# Patient Record
Sex: FEMALE | Race: AMERICAN INDIAN OR ALASKA NATIVE
[De-identification: names, ages, dates, MRNs, and addresses within clinical notes are randomized per-mention and may not be internally consistent; named-entity substitution may affect disease eponyms.]

---

## 2017-07-20 ENCOUNTER — HOSPITAL ENCOUNTER (INPATIENT)
Dept: HOSPITAL 5 - TRG | Age: 37
LOS: 5 days | Discharge: HOME | DRG: 781 | End: 2017-07-25
Attending: OBSTETRICS & GYNECOLOGY | Admitting: OBSTETRICS & GYNECOLOGY
Payer: COMMERCIAL

## 2017-07-20 DIAGNOSIS — F41.9: ICD-10-CM

## 2017-07-20 DIAGNOSIS — F43.10: ICD-10-CM

## 2017-07-20 DIAGNOSIS — Z92.21: ICD-10-CM

## 2017-07-20 DIAGNOSIS — Z3A.24: ICD-10-CM

## 2017-07-20 DIAGNOSIS — O44.12: Primary | ICD-10-CM

## 2017-07-20 DIAGNOSIS — O36.5920: ICD-10-CM

## 2017-07-20 DIAGNOSIS — O09.522: ICD-10-CM

## 2017-07-20 DIAGNOSIS — Z92.3: ICD-10-CM

## 2017-07-20 DIAGNOSIS — Z85.72: ICD-10-CM

## 2017-07-20 DIAGNOSIS — O99.342: ICD-10-CM

## 2017-07-20 LAB
BASOPHILS NFR BLD AUTO: 0.4 % (ref 0–1.8)
EOSINOPHIL NFR BLD AUTO: 1.5 % (ref 0–4.3)
HCT VFR BLD CALC: 33.9 % (ref 30.3–42.9)
HGB BLD-MCNC: 11.7 GM/DL (ref 10.1–14.3)
HIV-1 ANTIGEN P24: (no result)
HIVR-1/2 AB: (no result)
MCH RBC QN AUTO: 32 PG (ref 28–32)
MCHC RBC AUTO-ENTMCNC: 35 % (ref 30–34)
MCV RBC AUTO: 93 FL (ref 79–97)
PLATELET # BLD: 166 K/MM3 (ref 140–440)
RBC # BLD AUTO: 3.64 M/MM3 (ref 3.65–5.03)
WBC # BLD AUTO: 8.1 K/MM3 (ref 4.5–11)

## 2017-07-20 PROCEDURE — 86900 BLOOD TYPING SEROLOGIC ABO: CPT

## 2017-07-20 PROCEDURE — 86592 SYPHILIS TEST NON-TREP QUAL: CPT

## 2017-07-20 PROCEDURE — 86803 HEPATITIS C AB TEST: CPT

## 2017-07-20 PROCEDURE — 81001 URINALYSIS AUTO W/SCOPE: CPT

## 2017-07-20 PROCEDURE — 83735 ASSAY OF MAGNESIUM: CPT

## 2017-07-20 PROCEDURE — 85025 COMPLETE CBC W/AUTO DIFF WBC: CPT

## 2017-07-20 PROCEDURE — 87806 HIV AG W/HIV1&2 ANTB W/OPTIC: CPT

## 2017-07-20 PROCEDURE — 76816 OB US FOLLOW-UP PER FETUS: CPT

## 2017-07-20 PROCEDURE — 36415 COLL VENOUS BLD VENIPUNCTURE: CPT

## 2017-07-20 PROCEDURE — 76805 OB US >/= 14 WKS SNGL FETUS: CPT

## 2017-07-20 PROCEDURE — 76819 FETAL BIOPHYS PROFIL W/O NST: CPT

## 2017-07-20 PROCEDURE — 86850 RBC ANTIBODY SCREEN: CPT

## 2017-07-20 PROCEDURE — 85660 RBC SICKLE CELL TEST: CPT

## 2017-07-20 PROCEDURE — 86901 BLOOD TYPING SEROLOGIC RH(D): CPT

## 2017-07-20 PROCEDURE — 86706 HEP B SURFACE ANTIBODY: CPT

## 2017-07-20 PROCEDURE — 76820 UMBILICAL ARTERY ECHO: CPT

## 2017-07-20 RX ADMIN — BETAMETHASONE SODIUM PHOSPHATE AND BETAMETHASONE ACETATE SCH MG: 3; 3 INJECTION, SUSPENSION INTRA-ARTICULAR; INTRALESIONAL; INTRAMUSCULAR at 21:05

## 2017-07-20 NOTE — HISTORY AND PHYSICAL REPORT
<ITALO BUI - Last Filed: 17 21:07>





History of Present Illness


Date of examination: 17 (Received a call from pt from her home that she 

was having heavy bleeding; pt has dx previa)


Chief complaint: 


bleeding started today @ 1700  Pt called CNM @ 1845 Pt was instructed to come 

directly to Triage"I have my daughter with me. I have to wait on my ."





History of present illness: 


EDC Confirmation:  2017


Gestational Age:  11 1/7 weeks





Past Pregnancy History 


   :      2


   Term Births:      1


   Living Children:   1





Pregnancy # 1


   Delivery date:     3/2006


   Weeks Gestation:   term


   Delivery type:     


   Anesthesia type:     epidural


   Delivery location:     [facility]


   Infant Sex:      Female


   Birth weight:      5-14








Past Medical History:


   Reviewed history from 2017 and no changes required:


      nonhodgekins lymphoma 3/2010 had chemo and radiation


      last radiation/chemo was 


      PTSD


      Anxiety/OCD





Past Surgical History:


   Reviewed history from 2017 and no changes required:


      


      lymphectomy


      port placement in and out


      Breast Surgery: 


      bone marrow aspiration


      Denies any prior history of complications from anesthesia. 


      Breast Biopsy: 2016 benign





Past Medical History 


Abnormal PAP: negative


HUEY Exposure: negative


Infertility: negative


Uterine Anomaly: negative


Uterine Surgery (not C/S): negative


Other Gynecologic Problems: negative





Social Hx: Patient is 





Smoking History:


Patient has never smoked.








Infection History 


Hx of STD: none


HIV Risk Eval: low risk


Hepatitis B Risk Eval: low risk


Personal hx. of genital herpes: no


Partner hx. of genital herpes: no


Rash, Viral, or Febrile illness since last LMP? no


Varicella/Chicken Pox Status: Previous Disease


TB Risk: no





Genetic History 


ADVANCED MATERNAL AGE


Congenital Heart Defect:


    Mom: no  Dad: no


Canavan Disease:


    Mom: no  Dad: no


Thalassemia


    Mom: no  Dad: no


Neural Tube Defect


    Mom: no  Dad: no


Down's Syndrome


    Mom: no  Dad: no


Cheko-Sachs


    Mom: no  Dad: no


Sickle Cell Disease/Trait


    Mom: no  Dad: no


Hemophilia


    Mom: no  Dad: no


Muscular Dystrophy


    Mom: no  Dad: no


Cystic Fibrosis


    Mom: no  Dad: no


Wallace Chorea


    Mom: no  Dad: no


Mental Retardation


    Mom: no  Dad: no


Fragile X


    Mom: no  Dad: no


Other Genetic/Chromosomal Disorder


    Mom: no  Dad: no


Child w/other birth defect


    Mom: no  Dad: no





Enviromental Exposures 


Xray Exposure: no


Medication, drug, or alcohol use since LMP: no


Chemical/Other Exposure: no


Exposure to Cat Liter: no


Hx of Parvovirus (Fifth Disease): no


Occupational Exposure to Children: none


Active Medications (reviewed today):


ZOFRAN ODT 8 MG TBDP (ONDANSETRON) 1 po q12hrs prn


TRNESSA () 





Current Allergies (reviewed today):


No known allergies


Laboratory Results 





Routine Urinalysis 


Leukocytes: negative


Nitrite: negative


Urobilinogen: negative


Protein: negative


Blood: negative


Ketone: negative


Bilirubin: negative


Glucose: negative


Urine HCG: positive





Review of Systems 





General


     Denies fever, chills, sweats, anorexia, fatigue, weakness, malaise, weight 

loss and sleep disorder.





Prenatal


     Denies nausea, vomiting, headache, swelling of legs, abdominal pain, 

vaginal discharge, vaginal bleeding and contractions.








     Denies vaginal discharge, incontinence, dysuria, hematuria, urinary 

frequency, amenorrhea, menorrhagia, abnormal vaginal bleeding, pelvic pain, 

genital sores, decreased libido, painful periods, painful sex, urinary urgency, 

hot flashes, vaginal dryness, vaginal itching and vaginal odor.





CV


     Denies chest pains, palpitations, syncope, dyspnea on exertion, orthopnea, 

PND and peripheral edema.





Resp


     Denies cough, dyspnea at rest, excessive sputum, hemoptysis, wheezing and 

pleurisy.





GI


     Denies nausea, vomiting, diarrhea, constipation, change in bowel habits, 

abdominal pain, melena, hematochezia, jaundice, gas/bloating, indigestion/

heartburn, dysphagia and odynophagia.





Endo


     Denies cold intolerance, heat intolerance, polydipsia, polyphagia, 

polyuria and unusual weight change.





Breast


     Denies left breast lump, right breast lump, nipple discharge, bloody 

discharge from nipple, breast pain, abnormal mammogram and breast enlargement.





MS


     Denies back pain, joint pain, joint swelling, muscle cramps, muscle 

weakness, stiffness, arthritis, sciatica, restless legs, leg pain at night and 

leg pain with exertion.





Derm


     Denies rash, itching, dryness and suspicious lesions.





Neuro


     Denies paralysis, paresthesias, headache, seizures, tremors, vertigo, 

transient blindness, frequent falls, frequent headaches and difficulty walking.





Psych


     Denies depression, anxiety, irritability and mood swings.





Eyes


     Denies blurring, diplopia, irritation, discharge, vision loss, eye pain 

and photophobia.





ENT


     Denies earache, ear discharge, tinnitus, decreased hearing, nasal 

congestion, nosebleeds, sore throat and hoarseness.





Allergy


     Denies urticaria, allergic rash, hay fever and recurrent infections.





Heme


     Denies abnormal bruising, bleeding and enlarged lymph nodes.





PHYSICAL EXAM 


HEENT: PERRLA, normal conjunctiva, external nose and nasal mucosa normal, 

oropharynx clear


Neck/Thyroid: supple, thyroid normal


Skin no significant abnormal lesions or rashes


Chest: respiratory effort normal, clear to auscultation


Breasts: normal without skin changes or masses


CV: regular, normal S1-S2, no murmur, no rub, no gallop


Abdomen: normal bowel sounds, soft, nontender, no HSM


Musculoskeletal: grossly normal ROM in joints, no joint tenderness or muscle 

weakness


Neuro: grossly normal DTRs, sensation, strength, cranial nerves


Extremities: no clubbing, cyanosis, or edema





GYN Exams 


Vulva/Vagina: No lesions, normal BUS, normal rugae


Cervix: No lesions; no cervical motion tenderness


Uterus: normal size and position, midline, mobile


Adnexae: no masses or tenderness


Rectovaginal: no masses or tenderness








Past History





- Obstetrical History


Expected Date of Delivery: 17


Actual Gestation: 23 Week(s) 4 Day(s) 


: 2


Para: 1


Hx # Term Pregnancies: 1


Number of Living Children: 1





Medications and Allergies


 Allergies











Allergy/AdvReac Type Severity Reaction Status Date / Time


 


No Known Drug Allergies Allergy  Unknown Verified 17 21:22











Active Meds: 


Active Medications





Acetaminophen (Tylenol)  650 mg PO Q4H PRN


   PRN Reason: Pain MILD(1-3)/Fever >100.5/HA


Betamethasone Acet/Betameth SodPhos (Celestone Soluspan)  12 mg IM Q24HR MARCO


Docusate Sodium (Colace)  100 mg PO Q12H PRN


   PRN Reason: Constipation


Multivitamins/Iron/Calcium (Prenatal Vitamin)  1 each PO QDAY MARCO


Ondansetron HCl (Zofran)  4 mg IV Q6H PRN


   PRN Reason: Nausea And Vomiting











Review of Systems


All systems: negative





- Vital Signs


Vital signs: 


 Vital Signs











Temp Pulse Resp BP Pulse Ox


 


 98.6 F   81   18   103/58   100 


 


 17 20:06  17 20:06  17 20:06  17 20:06  17 20:06








 











Temp Pulse Resp BP Pulse Ox


 


 98.6 F   86   18   103/58   100 


 


 17 20:06  17 20:06  17 20:06  17 20:06  17 20:06














- Physical Exam


Breasts: Positive: deferred


Cardiovascular: Regular rate, Normal S1, Normal S2


Lungs: Positive: Normal air movement


Abdomen: Positive: normal appearance, soft, normal bowel sounds.  Negative: 

distention, tenderness


Genitourinary (Female): Positive: other (blood noted on vulva, pubic hair, and 

labia)


Vulva: both: normal


Vagina: Positive: other (bleeding from previa)


Uterus: Positive: normal size, normal contour


Anus/Rectum: Positive: normal perianal skin, heme negative.  Negative: rectal 

mass, hemorrhoids


Extremities: Positive: normal


Deep Tendon Reflex Grade: Normal +2





- Obstetrical


FHR: category 1


Uterine Contraction Monitor Mode: External


Uterine Contraction Pattern: Absent


Uterine Tone Measurement Phase: Resting





Results


Result Diagrams: 


 17 20:09





 Abnormal lab results











  17 Range/Units





  20:09 


 


RBC  3.64 L  (3.65-5.03)  M/mm3


 


MCHC  35 H  (30-34)  %


 


Mono % (Auto)  8.3 H  (0.0-7.3)  %








All other labs normal.








Assessment and Plan





- Patient Problems


(1) Placenta previa antepartum in second trimester


Onset Date: Unknown   Current Visit: Yes   Status: Acute   


Plan to address problem: 


pt presents to Triage with heavy bleeding that started today @ 1700 while pt 

was at home Pt has a known placenta previa. Pt states she did wait to call  

 notified of pt's admission. All orders in EMR. Pt is aware of POC: 

close observation of vaginal bleeding Potential for emergency C/S if heavy 

bleeding occurs again.


AMFM consult ordered. NICU consult done. 


US done today notes EFW 4% with wgt of 487gm


All questions addressed and answered. 














<ROLA MENDEZ - Last Filed: 17 21:57>





Medications and Allergies


Active Meds: 


Active Medications





Acetaminophen (Tylenol)  650 mg PO Q4H PRN


   PRN Reason: Pain MILD(1-3)/Fever >100.5/HA


Betamethasone Acet/Betameth SodPhos (Celestone Soluspan)  12 mg IM Q24H MARCO


   Stop: 17 21:01


Docusate Sodium (Colace)  100 mg PO Q12H PRN


   PRN Reason: Constipation


Multivitamins/Iron/Calcium (Prenatal Vitamin)  1 each PO QDAY MARCO


Ondansetron HCl (Zofran)  4 mg IV Q6H PRN


   PRN Reason: Nausea And Vomiting











- Vital Signs


Vital signs: 


 Vital Signs











Temp Pulse Resp BP Pulse Ox


 


 98.6 F   81   18   103/58   100 


 


 17 20:06  17 20:06  17 20:06  17 20:06  17 20:06








 











Temp Pulse Resp BP Pulse Ox


 


 98.6 F   86   18   103/58   100 


 


 17 20:06  17 20:06  17 20:06  17 20:06  17 20:06














- Physical Exam


Genitourinary (Female): Positive: other (blood noted on vulva, pubic hair, and 

labia)





Results


Result Diagrams: 


 17 20:09





 Abnormal lab results











  17 Range/Units





  20:09 


 


RBC  3.64 L  (3.65-5.03)  M/mm3


 


MCHC  35 H  (30-34)  %


 


Mono % (Auto)  8.3 H  (0.0-7.3)  %








All other labs normal.








Assessment and Plan





- Patient Problems


(1) 23 weeks gestation of pregnancy


Current Visit: Yes   Status: Acute   


Plan to address problem: 


Discuss extreme prematurity and borderline viability. NICU consulted. She 

desires aggressive management to include steroid therapy, MgSO4 for 

neuroprotection and c/s for delivery for needed. 








(2) Placenta previa antepartum in second trimester


Onset Date: Unknown   Current Visit: Yes   Status: Acute   





(3) Elderly multigravida in second trimester


Current Visit: Yes   Status: Acute   





(4) Hemorrhage, antepartum, second trimester


Current Visit: Yes   Status: Acute   


Plan to address problem: 


Small quarter size spot of BRB on chux initially noted. No active bleeding 

noted now. Russ placed, will continue bedrest for now. 








(5) History of non-Hodgkin's lymphoma


Current Visit: Yes   Status: Acute   





(6) Post traumatic stress disorder (PTSD)


Current Visit: Yes   Status: Acute   


Plan to address problem: 


Will continue antidepressant








(7) IUGR (intrauterine growth restriction) affecting care of mother


Current Visit: Yes   Status: Acute   


Qualifiers: 


   Fetus number: single or unspecified fetus   Trimester: second trimester   

Qualified Code(s): O36.5920 - Maternal care for other known or suspected poor 

fetal growth, second trimester, not applicable or unspecified   


Plan to address problem: 


MFM consultation

## 2017-07-21 LAB
BILIRUB UR QL STRIP: (no result)
BLOOD UR QL VISUAL: (no result)
KETONES UR STRIP-MCNC: (no result) MG/DL
LEUKOCYTE ESTERASE UR QL STRIP: (no result)
MUCOUS THREADS #/AREA URNS HPF: (no result) /HPF
NITRITE UR QL STRIP: (no result)
PH UR STRIP: 8 [PH] (ref 5–7)
PROT UR STRIP-MCNC: (no result) MG/DL
RBC #/AREA URNS HPF: 5 /HPF (ref 0–6)
UROBILINOGEN UR-MCNC: < 2 MG/DL (ref ?–2)
WBC #/AREA URNS HPF: 1 /HPF (ref 0–6)

## 2017-07-21 RX ADMIN — Medication SCH EACH: at 12:12

## 2017-07-21 RX ADMIN — BETAMETHASONE SODIUM PHOSPHATE AND BETAMETHASONE ACETATE SCH MG: 3; 3 INJECTION, SUSPENSION INTRA-ARTICULAR; INTRALESIONAL; INTRAMUSCULAR at 21:08

## 2017-07-21 RX ADMIN — SODIUM CHLORIDE, SODIUM LACTATE, POTASSIUM CHLORIDE, AND CALCIUM CHLORIDE SCH MLS/HR: .6; .31; .03; .02 INJECTION, SOLUTION INTRAVENOUS at 17:46

## 2017-07-21 RX ADMIN — SODIUM CHLORIDE, SODIUM LACTATE, POTASSIUM CHLORIDE, AND CALCIUM CHLORIDE SCH MLS/HR: .6; .31; .03; .02 INJECTION, SOLUTION INTRAVENOUS at 07:37

## 2017-07-21 NOTE — ADMIT CRITERIA FORM
Admission Criteria Documentation: 





                   OBSTETRIC AND GYNECOLOGIC DISEASE GRG





Clinical Indications for Admission to Inpatient Care





                                                                               

       (Place 'X' for any and all applicable criteria):





Hospital admission is needed for appropriate care of the patient because of 1 

or more of the following (1)(2)(3): 





[ ]I.   Hemodynamic instability, as indicated by 1 or more of the following (1)(

2)(3)(4)(5):


        [ ]a)   Vital signs or other findings not as expected for chronic 

patient condition or baseline


        [ ]b)   Instability indicated by 1 or more of the following:


                        [ ]i)       Hypotension


                        [ ]ii)      Symptomatic tachycardia unresponsive to 

treatment (eg, analgesia, fluids, sedation as indicated)


                        [ ]iii)     Inadequate perfusion indicated by 1 or more 

of the following:


                                   [ ]A.   Lactic acidosis (greater than 2 mmol/

L)


                                   [ ]B.   New abnormal capillary refill (

greater than 3 seconds)


                                   [ ]C.   Reduced urine output


                                   [ ]D.   New altered mental status


                        [ ]iv)     Orthostatic vital sign changes unresponsive 

to treatment (eg, fluids)


                        [ ]v)      Multiple IV fluid boluses required to 

maintain adequate blood pressure or perfusion


                        [ ]vi)     IV inotropic or vasopressor medication 

required to maintain adequate blood pressure or perfusion


[ ]II.   Obstetric infection requiring hospitalization indicated by 1 or more 

of the following(13)(14):


         [ ]a)        Chorioamnionitis


         [ ]b)        Endometritis (except mild postpartum endometritis)


         [ ]c)        Pelvic abscess


         [ ]d)        Peritonitis


         [ ]e)        Septic pelvic thrombophlebitis


[ ]III.  Amniotic fluid or pulmonary embolism(4)(5)(6)


[ ]IV. Suspected peritonitis or ectopic pregnancy requiring monitoring beyond 

scope of 24 hours or observation care(7)(8)


[ ]V.  Fetal compromise requiring hospitalization indicated by ALL of the 

following(9)(10):


        [ ]a)   Fetal compromise indicated by 1 or more of the following(11):


                        [ ]i)       Abnormal fetal heart rate monitoring


                        [ ]ii)      Abnormal contraction stress test


                        [ ]iii)     Abnormal fetal biophysical profile


                        [ ]iv)     Abnormal Doppler flow in fetal vessels (ie, 

Doppler velocimetry) (12)


        [ ]b)   Persistence of fetal compromise indicators during evaluation 

and observation monitoring


[ ]VI. Ovarian hyperstimulation syndrome requiring hospitalization[A] indicated 

by ALL of the following(15):


        [ ]a)   Recent ovarian stimulation with gonadotropins, or evidence on 

ultrasound of spontaneous emergence 


                        of large number of ovarian follicles


        [ ]b)   Evidence of severe ovarian hyperstimulation syndrome indicated 

by 1 or more of the following:


                        [ ]i)      Abdominal pain unresponsive to oral therapy


                        [ ]ii)     Acute respiratory distress syndrome


                        [ ]iii)    Electrolyte imbalance ( eg, hyponatremia, 

hyperkalemia)


                        [ ]iv)    Elevated liver enzymes


                        [ ]v)     Evidence of thromboembolism


                        [ ]vi)    Hemoconcentration (hematocrit greater than 45

% (0.45))


                        [ ]vii)   Inability to maintain oral intake adequate to 

prevent hemoconcentration


                        [ ]viii)  Marked hypotension from baseline (eg, SBP 20 

mmHg below patients usual pressure)


                        [ ]ix)   Oliguria or anuria


                        [ ]x)    Ovarian torsion


                        [ ]xi)   Pleural or pericardial effusion on x-ray or 

echocardiogram


                        [ ]xii)  Rapid increase in serum creatinine to greater 

than 1.2 mg/dL (106 micromoles/L) or creatinine 


                                 clearance less than 50 mL/min/1.73m2 (0.84 mL/

sec/1.73m2)


                        [ ]xiii) Ruptured ovarian cyst with hemorrhage


                        [ ]xiv) Severe abdominal pain or peritoneal signs


                        [ ]xv)  Tense ascites that cannot be managed with 

paracentesis in outpatient setting


[ ]VII.Pelvic infection requiring hospitalization indicated by 1 or more of the 

following (16):


        [ ]a)   Outpatient treatment has failed or is not appropriate (eg, 

inpatient monitoring required)


        [ ]b)   Pelvic abscess


        [ ]c)   Surgical emergency cannot be excluded (eg, rigid abdomen)


        [ ]d)   Vomiting precluding outpatient and observation care management


VIII.   Pregnancy loss complications requiring inpatient medical treatment 

indicated by 1 or more of the following (4)(7)(9):


        [ ]a)   Fever


        [ ]b)   Peritonitis


        [ ]c)   Sepsis


        [ ]d)   Severe abdominal pain


[ ]IX.  Pregnant or postpartum patient requiring monitoring for severe heart 

failure, pulmonary disease, or other 


         comorbid condition (eg, peripartum cardiomyopathy) (4)(17)


[ ]X.   Pregnant patient with  rupture of membranes requiring 

hospitalization indicated by ANY ONE of the following:


         [ ]a)        Chorioamnionitis, cloudy amniotic fluid, or other 

evidence of infection


         [ ]b)        Fetal compromise or other need for fetal monitoring (11)


         [ ]c)        Gestation longer than 23 weeks and ANY ONE of the 

following:


                       [ ]i)        Abnormal (noncephalic) presentation


                       [ ]ii)       Inadequate home environment (eg, home too 

far from hospital, unable to rapidly return to hospital)


           [ ]d)      Temperature greater than 100.4 degrees F (38 degrees C)(

oral)


           [ ]e)      Threatened  labor requiring monitoring beyond 

scope (eg, over 24 hours) of observation Care


[ ]  XI.Postpartum complications, including severe lacerations, infections, or 

retained placenta (19)


[X ]  XII.Uterine bleeding with high-risk features indicated by ANY ONE of the 

following (4):


          [ ]a)      Active major hemorrhage (eg, postpartum hemorrhage)


          [ ]b)      Coagulopathy with active bleeding


          [ ]c)      Gestational trophoblastic disease (eg, molar pregnancy) (20

)


          [ X]d)      Pregnancy (longer than 23 weeks) and ANY ONE of the 

following:


                      [ ]i)       Pain


                      [ ]ii)      Placental abruption, known or suspected


                      [ ]iii)     Placenta accrete, known or suspected(21)


                      [X ]iv)     Placenta previa, known or suspected


                      [ ]v)      Vasa previa


          [ ]e)      Severe anemia


[ ]XIII.  Obstetric or Gynecologic Disease, condition or symptom for which ANY 

ONE of the following:


          [ ]a)      Emergency and observation care have failed or are not 

considered appropriate  


                      ( Also use General Criteria: Observation Care  Criteria 

as appropriate)


          [ ]b)      Presence of  a General Admission Criteria or Pediatric 

General Admission Criteria











The original [facility] [employer] content created by [facility] has been revised. 


The portions of the content which have been revised are identified through the 

use of italic text or in bold, and [facility] 


has neither reviewed nor approved the modified material.All other unmodified 

content is copyright  [facility].





Please see references footnoted in the original [facility] edition 

2016





Admission Criteria Met: Yes

## 2017-07-21 NOTE — ULTRASOUND REPORT
COMPLETE OB ULTRASOUND:



Gestation: garcia



Fetal Position: cephalic,variable



Amniotic Fluid: wnl

  

Placenta: marginal previa

  Placental Grade: 0



Fetal Heart Rate:

  150 BPM



Cervical length: 3.2 cm (Normal > 3 cm)



NEUROANATOMY VISUALIZED:

Choroid Plexus

Lateral Ventricle



ANATOMY VISUALIZED:

Stomach

Bladder

Diaphragm

4 Chamber Heart

Heart

3 Vessel Cord

Abd. Cord Insert



SPINE VISUALIZED:

Longitudinal



The following are not demonstrated due to maternal body habitus

or fetal lie: cisterna magna,cerebellum,kidneys,ap\T\transverse spine



BPD: 5.3 cm = 22 w 1 d



 HC: 20 cm = 22 w 1 d



 AC: 17 cm = 22 w 2 d



 FL: 3.8 cm = 22 w 2 d



HC/AC Ratio: 



Cephalic Index: 1.16



Estimated Fetal Weight: 487 grams



LMP: 2/5/17



Clinical age = 23 w 4 d      EDC: 11/12/17



US Gest. Age = 22 w 2 d      EDC: 11/21/17

## 2017-07-21 NOTE — PROGRESS NOTE
Assessment and Plan


  38y/o @ 23+5 weeks with complete previa, second bleed.  Patient resting w/o 

complaints. fht tracing well for gestation, + fm, no ctx or new bleeding. 

magnesium sulfate infusing x 24h w/q6h mag levels. Second dose of BMZ due @ 

2105. Continue current management.








- Patient Problems


(1) 23 weeks gestation of pregnancy


Current Visit: Yes   Status: Acute   





(2) Elderly multigravida in second trimester


Current Visit: Yes   Status: Acute   





(3) Placenta previa antepartum in second trimester


Onset Date: Unknown   Current Visit: Yes   Status: Acute   





Subjective





- Subjective


Date of service: 07/21/17


Principal diagnosis: IUp @ 23+4, previa


Patient reports: vaginal bleeding (no new bleeding, scant amount of brown blood 

on pad), fetal movement normal, no new complaints, no loss of fluid, no 

contractions





Objective





- Vital Signs


Vital Signs: 


 Vital Signs - 12hr











  07/20/17 07/20/17 07/20/17





  20:06 22:08 22:10


 


Temperature 98.6 F  98.2 F


 


Pulse Rate 86 84 


 


Pulse Rate [   84





From Monitor]   


 


Respiratory 18  20





Rate   


 


Blood Pressure 103/58 101/56 


 


Blood Pressure   101/56





[Left Arm]   


 


O2 Sat by Pulse 100  





Oximetry   














  07/20/17 07/21/17 07/21/17





  23:30 00:30 00:31


 


Temperature   


 


Pulse Rate 88 96 H 83


 


Pulse Rate [   





From Monitor]   


 


Respiratory   





Rate   


 


Blood Pressure 95/54  104/62


 


Blood Pressure   





[Left Arm]   


 


O2 Sat by Pulse  98 





Oximetry   














  07/21/17 07/21/17 07/21/17





  00:35 00:40 00:45


 


Temperature   


 


Pulse Rate 93 H 82 85


 


Pulse Rate [   





From Monitor]   


 


Respiratory   





Rate   


 


Blood Pressure   


 


Blood Pressure   





[Left Arm]   


 


O2 Sat by Pulse 97 97 96





Oximetry   














  07/21/17 07/21/17 07/21/17





  00:50 00:55 01:00


 


Temperature   


 


Pulse Rate 94 H 82 84


 


Pulse Rate [   





From Monitor]   


 


Respiratory   





Rate   


 


Blood Pressure   


 


Blood Pressure   





[Left Arm]   


 


O2 Sat by Pulse 97 97 98





Oximetry   














  07/21/17 07/21/17 07/21/17





  01:05 01:10 01:15


 


Temperature   


 


Pulse Rate 84 89 80


 


Pulse Rate [   





From Monitor]   


 


Respiratory   





Rate   


 


Blood Pressure   


 


Blood Pressure   





[Left Arm]   


 


O2 Sat by Pulse 97 97 96





Oximetry   














  07/21/17 07/21/17 07/21/17





  01:20 01:25 01:30


 


Temperature   


 


Pulse Rate 81 85 79


 


Pulse Rate [   





From Monitor]   


 


Respiratory   





Rate   


 


Blood Pressure   93/57


 


Blood Pressure   





[Left Arm]   


 


O2 Sat by Pulse 96 96 96





Oximetry   














  07/21/17 07/21/17 07/21/17





  01:33 01:35 01:40


 


Temperature   


 


Pulse Rate 82 83 89


 


Pulse Rate [   





From Monitor]   


 


Respiratory   





Rate   


 


Blood Pressure   


 


Blood Pressure   





[Left Arm]   


 


O2 Sat by Pulse 94 95 95





Oximetry   














  07/21/17 07/21/17 07/21/17





  01:45 01:50 01:55


 


Temperature   


 


Pulse Rate 86 87 83


 


Pulse Rate [   





From Monitor]   


 


Respiratory   





Rate   


 


Blood Pressure   


 


Blood Pressure   





[Left Arm]   


 


O2 Sat by Pulse 94 96 95





Oximetry   














  07/21/17 07/21/17 07/21/17





  02:00 02:05 02:10


 


Temperature   


 


Pulse Rate 86 87 78


 


Pulse Rate [   





From Monitor]   


 


Respiratory   





Rate   


 


Blood Pressure   


 


Blood Pressure   





[Left Arm]   


 


O2 Sat by Pulse 96 97 96





Oximetry   














  07/21/17 07/21/17 07/21/17





  02:15 02:20 02:25


 


Temperature   


 


Pulse Rate 79 82 81


 


Pulse Rate [   





From Monitor]   


 


Respiratory   





Rate   


 


Blood Pressure   


 


Blood Pressure   





[Left Arm]   


 


O2 Sat by Pulse 97 97 97





Oximetry   














  07/21/17 07/21/17 07/21/17





  02:30 02:35 02:40


 


Temperature   


 


Pulse Rate 82 94 H 83


 


Pulse Rate [   





From Monitor]   


 


Respiratory   





Rate   


 


Blood Pressure 99/58  


 


Blood Pressure   





[Left Arm]   


 


O2 Sat by Pulse 96 97 97





Oximetry   














  07/21/17 07/21/17 07/21/17





  02:45 02:48 02:50


 


Temperature  98.2 F 


 


Pulse Rate 96 H  80


 


Pulse Rate [  96 H 





From Monitor]   


 


Respiratory  20 





Rate   


 


Blood Pressure   


 


Blood Pressure  99/58 





[Left Arm]   


 


O2 Sat by Pulse 100 100 96





Oximetry   














  07/21/17 07/21/17 07/21/17





  02:55 03:00 03:05


 


Temperature   


 


Pulse Rate 80 84 79


 


Pulse Rate [   





From Monitor]   


 


Respiratory   





Rate   


 


Blood Pressure   


 


Blood Pressure   





[Left Arm]   


 


O2 Sat by Pulse 96 96 97





Oximetry   














  07/21/17 07/21/17 07/21/17





  03:10 03:15 03:20


 


Temperature   


 


Pulse Rate 89 95 H 90


 


Pulse Rate [   





From Monitor]   


 


Respiratory   





Rate   


 


Blood Pressure   


 


Blood Pressure   





[Left Arm]   


 


O2 Sat by Pulse 97 98 98





Oximetry   














  07/21/17 07/21/17 07/21/17





  03:25 03:30 03:35


 


Temperature   


 


Pulse Rate 85 78 87


 


Pulse Rate [   





From Monitor]   


 


Respiratory   





Rate   


 


Blood Pressure  94/56 


 


Blood Pressure   





[Left Arm]   


 


O2 Sat by Pulse 96 97 96





Oximetry   














  07/21/17 07/21/17 07/21/17





  03:40 03:45 03:50


 


Temperature   


 


Pulse Rate 96 H 85 89


 


Pulse Rate [   





From Monitor]   


 


Respiratory   





Rate   


 


Blood Pressure   


 


Blood Pressure   





[Left Arm]   


 


O2 Sat by Pulse 96 97 98





Oximetry   














  07/21/17 07/21/17 07/21/17





  03:55 04:00 04:05


 


Temperature   


 


Pulse Rate 85 81 82


 


Pulse Rate [   





From Monitor]   


 


Respiratory   





Rate   


 


Blood Pressure   


 


Blood Pressure   





[Left Arm]   


 


O2 Sat by Pulse 97 97 97





Oximetry   














  07/21/17 07/21/17 07/21/17





  04:10 04:30 05:30


 


Temperature   


 


Pulse Rate 95 H 83 78


 


Pulse Rate [   





From Monitor]   


 


Respiratory   





Rate   


 


Blood Pressure  82/48 94/55


 


Blood Pressure   





[Left Arm]   


 


O2 Sat by Pulse 96  





Oximetry   














  07/21/17 07/21/17





  06:30 07:09


 


Temperature  


 


Pulse Rate 85 90


 


Pulse Rate [  





From Monitor]  


 


Respiratory  





Rate  


 


Blood Pressure 97/63 104/61


 


Blood Pressure  





[Left Arm]  


 


O2 Sat by Pulse  





Oximetry  














- Exam


Breasts: normal


Cardiovascular: Regular rate


Lungs: Clear to auscultation, Normal air movement


Abdomen: Present: normal appearance, soft


Vulva: both: normal


Uterus: Present: normal


FHR: auscultation normal


Uterine Contraction Monitor Mode: External


Uterine Contraction Frequency (min): none


Uterine Contraction Pattern: Absent


Uterine Tone Measurement Phase: Resting


Extremities: normal


Deep Tendon Reflex Grade: Normal +2





- Labs


Labs: 


 Abnormal Labs











  07/20/17 07/21/17 07/21/17





  20:09 00:15 06:15


 


RBC  3.64 L  


 


MCHC  35 H  


 


Mono % (Auto)  8.3 H  


 


Magnesium    4.20 H


 


Urine pH   8.0 H 








 Laboratory Results - last 24 hr











  07/20/17 07/20/17 07/20/17





  20:09 20:09 20:09


 


WBC  8.1  


 


RBC  3.64 L  


 


Hgb  11.7  


 


Hct  33.9  


 


MCV  93  


 


MCH  32  


 


MCHC  35 H  


 


RDW  13.2  


 


Plt Count  166  


 


Lymph % (Auto)  21.8  


 


Mono % (Auto)  8.3 H  


 


Eos % (Auto)  1.5  


 


Baso % (Auto)  0.4  


 


Lymph #  1.8  


 


Mono #  0.7  


 


Eos #  0.1  


 


Baso #  0.0  


 


Seg Neutrophils %  68.0  


 


Seg Neutrophils #  5.5  


 


Sickle Cell Screen    Negative


 


Magnesium   


 


Urine Color   


 


Urine Turbidity   


 


Urine pH   


 


Ur Specific Gravity   


 


Urine Protein   


 


Urine Glucose (UA)   


 


Urine Ketones   


 


Urine Blood   


 


Urine Nitrite   


 


Urine Bilirubin   


 


Urine Urobilinogen   


 


Ur Leukocyte Esterase   


 


Urine WBC (Auto)   


 


Urine RBC (Auto)   


 


U Epithel Cells (Auto)   


 


Urine Mucus   


 


Hep Bs Antigen   


 


Hepatitis C Antibody   


 


HIV 1&2 Antibody Rapid   


 


HIV P24 Antigen   


 


Blood Type   A POSITIVE 


 


Antibody Screen   TNR 


 


EDEN Antibody Screen   Negative 














  07/20/17 07/20/17 07/20/17





  22:00 22:00 22:00


 


WBC   


 


RBC   


 


Hgb   


 


Hct   


 


MCV   


 


MCH   


 


MCHC   


 


RDW   


 


Plt Count   


 


Lymph % (Auto)   


 


Mono % (Auto)   


 


Eos % (Auto)   


 


Baso % (Auto)   


 


Lymph #   


 


Mono #   


 


Eos #   


 


Baso #   


 


Seg Neutrophils %   


 


Seg Neutrophils #   


 


Sickle Cell Screen   


 


Magnesium   


 


Urine Color   


 


Urine Turbidity   


 


Urine pH   


 


Ur Specific Gravity   


 


Urine Protein   


 


Urine Glucose (UA)   


 


Urine Ketones   


 


Urine Blood   


 


Urine Nitrite   


 


Urine Bilirubin   


 


Urine Urobilinogen   


 


Ur Leukocyte Esterase   


 


Urine WBC (Auto)   


 


Urine RBC (Auto)   


 


U Epithel Cells (Auto)   


 


Urine Mucus   


 


Hep Bs Antigen   Non-reactive 


 


Hepatitis C Antibody  Non-reactive  


 


HIV 1&2 Antibody Rapid    Non react


 


HIV P24 Antigen    Non react


 


Blood Type   


 


Antibody Screen   


 


EDEN Antibody Screen   














  07/21/17 07/21/17





  00:15 06:15


 


WBC  


 


RBC  


 


Hgb  


 


Hct  


 


MCV  


 


MCH  


 


MCHC  


 


RDW  


 


Plt Count  


 


Lymph % (Auto)  


 


Mono % (Auto)  


 


Eos % (Auto)  


 


Baso % (Auto)  


 


Lymph #  


 


Mono #  


 


Eos #  


 


Baso #  


 


Seg Neutrophils %  


 


Seg Neutrophils #  


 


Sickle Cell Screen  


 


Magnesium   4.20 H


 


Urine Color  Straw 


 


Urine Turbidity  Clear 


 


Urine pH  8.0 H 


 


Ur Specific Gravity  1.008 


 


Urine Protein  <15 mg/dl 


 


Urine Glucose (UA)  Neg 


 


Urine Ketones  Tr 


 


Urine Blood  Sm 


 


Urine Nitrite  Neg 


 


Urine Bilirubin  Neg 


 


Urine Urobilinogen  < 2.0 


 


Ur Leukocyte Esterase  Neg 


 


Urine WBC (Auto)  1.0 


 


Urine RBC (Auto)  5.0 


 


U Epithel Cells (Auto)  < 1.0 


 


Urine Mucus  Few 


 


Hep Bs Antigen  


 


Hepatitis C Antibody  


 


HIV 1&2 Antibody Rapid  


 


HIV P24 Antigen  


 


Blood Type  


 


Antibody Screen  


 


EDEN Antibody Screen

## 2017-07-22 RX ADMIN — SODIUM CHLORIDE, SODIUM LACTATE, POTASSIUM CHLORIDE, AND CALCIUM CHLORIDE SCH MLS/HR: .6; .31; .03; .02 INJECTION, SOLUTION INTRAVENOUS at 13:05

## 2017-07-22 RX ADMIN — DOCUSATE SODIUM PRN MG: 100 CAPSULE, LIQUID FILLED ORAL at 15:36

## 2017-07-22 RX ADMIN — SODIUM CHLORIDE, SODIUM LACTATE, POTASSIUM CHLORIDE, AND CALCIUM CHLORIDE SCH MLS/HR: .6; .31; .03; .02 INJECTION, SOLUTION INTRAVENOUS at 03:02

## 2017-07-22 RX ADMIN — Medication SCH EACH: at 17:53

## 2017-07-22 NOTE — PROGRESS NOTE
Assessment and Plan





- Patient Problems


(1) 23 weeks gestation of pregnancy


Current Visit: Yes   Status: Acute   





(2) History of non-Hodgkin's lymphoma


Current Visit: Yes   Status: Acute   





(3) Placenta previa antepartum in second trimester


Onset Date: Unknown   Current Visit: Yes   Status: Acute   


Plan to address problem: 


-s/p  steroids


-plan to d/c home 24-48hour after initial bleeding episode if continues to have 

no heavy bleeding. Only old blood noted at this time.


-d/c magnesium








Subjective





- Subjective


Date of service: 17


Principal diagnosis: IUp @ 23+6, previa


Interval history: 


Pt doing well today. S/p second dose of BMZ last pm and will finish magnesium 

today. Pt denies any contractions. pt report small amount of spotting but 

active bleeding. Exam deferred to do several family members being present in 

room today.





Patient reports: vaginal bleeding (no new bleeding, scant amount of brown blood 

on pad), fetal movement normal, no new complaints, no loss of fluid, no 

contractions





Objective





- Vital Signs


Vital Signs: 


 Vital Signs - 12hr











  17





  01:30 02:04 02:31


 


Temperature  97.9 F 


 


Pulse Rate 88 93 H 86


 


Pulse Rate [  93 H 





From Monitor]   


 


Respiratory  18 





Rate   


 


Blood Pressure 91/50 97/54 93/53


 


Blood Pressure  97/54 





[Left Arm]   


 


O2 Sat by Pulse   





Oximetry   














  17





  03:30 04:30 04:49


 


Temperature   97.4 F L


 


Pulse Rate 91 H 87 


 


Pulse Rate [   





From Monitor]   


 


Respiratory   16





Rate   


 


Blood Pressure 91/50 91/51 


 


Blood Pressure   





[Left Arm]   


 


O2 Sat by Pulse   





Oximetry   














  17





  05:30 06:30 06:34


 


Temperature   


 


Pulse Rate 91 H 88 92 H


 


Pulse Rate [   





From Monitor]   


 


Respiratory   





Rate   


 


Blood Pressure 91/50 91/56 


 


Blood Pressure   





[Left Arm]   


 


O2 Sat by Pulse   100





Oximetry   














  17





  09:19 09:20 11:27


 


Temperature   


 


Pulse Rate 98 H 64 107 H


 


Pulse Rate [   





From Monitor]   


 


Respiratory   





Rate   


 


Blood Pressure 99/57  107/61


 


Blood Pressure   





[Left Arm]   


 


O2 Sat by Pulse  57 L 





Oximetry   














  17





  12:31


 


Temperature 


 


Pulse Rate 92 H


 


Pulse Rate [ 





From Monitor] 


 


Respiratory 





Rate 


 


Blood Pressure 104/59


 


Blood Pressure 





[Left Arm] 


 


O2 Sat by Pulse 





Oximetry 














- Exam


Lungs: Normal air movement


FHR: auscultation normal


Uterine Contraction Pattern: Absent


Extremities: normal





- Labs


Labs: 


 Abnormal Labs











  17





  20:09 00:15 06:15


 


RBC  3.64 L  


 


MCHC  35 H  


 


Mono % (Auto)  8.3 H  


 


Magnesium    4.20 H


 


Urine pH   8.0 H 














  17





  16:31 23:12 06:50


 


RBC   


 


MCHC   


 


Mono % (Auto)   


 


Magnesium  4.20 H  4.20 H  4.50 H


 


Urine pH   








 Laboratory Results - last 24 hr











  17





  16:31 23:12 06:50


 


Magnesium  4.20 H  4.20 H  4.50 H














- Results


US- obstetric: report reviewed

## 2017-07-23 RX ADMIN — SODIUM CHLORIDE, SODIUM LACTATE, POTASSIUM CHLORIDE, AND CALCIUM CHLORIDE SCH MLS/HR: .6; .31; .03; .02 INJECTION, SOLUTION INTRAVENOUS at 12:42

## 2017-07-23 RX ADMIN — DOCUSATE SODIUM PRN MG: 100 CAPSULE, LIQUID FILLED ORAL at 11:17

## 2017-07-23 RX ADMIN — Medication SCH EACH: at 11:18

## 2017-07-23 RX ADMIN — SODIUM CHLORIDE, SODIUM LACTATE, POTASSIUM CHLORIDE, AND CALCIUM CHLORIDE SCH MLS/HR: .6; .31; .03; .02 INJECTION, SOLUTION INTRAVENOUS at 01:15

## 2017-07-23 RX ADMIN — SODIUM CHLORIDE, SODIUM LACTATE, POTASSIUM CHLORIDE, AND CALCIUM CHLORIDE SCH MLS/HR: .6; .31; .03; .02 INJECTION, SOLUTION INTRAVENOUS at 02:19

## 2017-07-23 NOTE — PROGRESS NOTE
Assessment and Plan





ASSESSMENT


1. IUP @ 24 weeks


2. Placenta previa with bleeding





RECOMMENDATIONS


1. Continue expectant management


2. Inpatient management until 24 to 48 hours without bleeding








Subjective





- Subjective


Principal diagnosis: IUp @ 24, previa


Interval history: 


No current complaints.


She reports an episode of vaginal spotting eralier this morning- none currently.


Had some conractions overnight- none currently.


Good fetal movement. 


Patient reports: vaginal bleeding (bright red clot passed with some scant red 

blood with wiping this am), fetal movement normal, no new complaints, no loss 

of fluid, no contractions





Objective





- Vital Signs


Vital Signs: 


 Vital Signs - 12hr











  07/23/17 07/23/17 07/23/17





  04:23 04:24 04:30


 


Temperature   97.4 F L


 


Pulse Rate 89 90 


 


Respiratory   16





Rate   


 


Blood Pressure  85/51 


 


O2 Sat by Pulse 97  





Oximetry   














  07/23/17 07/23/17 07/23/17





  07:58 08:04 15:06


 


Temperature  98.2 F 


 


Pulse Rate 86  96 H


 


Respiratory  18 





Rate   


 


Blood Pressure 92/57  114/55


 


O2 Sat by Pulse  97 





Oximetry   














- Exam


Abdomen: Present: normal appearance, soft


FHR: category 1


Extremities: normal





- Labs


Labs: 


 Abnormal Labs











  07/20/17 07/21/17 07/21/17





  20:09 00:15 06:15


 


RBC  3.64 L  


 


MCHC  35 H  


 


Mono % (Auto)  8.3 H  


 


Magnesium    4.20 H


 


Urine pH   8.0 H 














  07/21/17 07/21/17 07/22/17





  16:31 23:12 06:50


 


RBC   


 


MCHC   


 


Mono % (Auto)   


 


Magnesium  4.20 H  4.20 H  4.50 H


 


Urine pH

## 2017-07-23 NOTE — PROGRESS NOTE
Assessment and Plan





- Patient Problems


(1) History of non-Hodgkin's lymphoma


Current Visit: Yes   Status: Acute   





(2) Placenta previa antepartum in second trimester


Onset Date: Unknown   Current Visit: Yes   Status: Acute   


Plan to address problem: 


-s/p  steroids


-cont expectant management


-some bleeding noted after d/c mag and peterson cath. plan to d/c home is am if 

bleeding remains stable.








(3) 24 weeks gestation of pregnancy


Current Visit: Yes   Status: Acute   





Subjective





- Subjective


Date of service: 17


Principal diagnosis: IUp @ 24, previa


Interval history: 


Pt did not have bright red bleeding only brown spotting during the day on 

yesterday but last pm after the peterson and the magnesium were d/c and she was 

allowed to ambulate to the bathroom, she was noted to have bright red clot 

times one that was nickel sized as per pt that was passed. Since this time she 

has had some spotting to bright red streaks on tissue after using the restroom. 

I d/w continued admission to monitor today to be sure bleeding continues to 

decrease. Pt was seen buy [facility] on Friday and recommendations were given. The 

full report has been faxed numerous times but the report has not gotten to 

labor and delivery. [facility] called again today to see if report could be refaxed. 

Pt agrees with continued close obs at this time as she is approaching 24 hrs 

off the magnesium this pm and 48hrs s/p second does of steroids again this pm. 

All questions were addressed and answered.


Patient reports: vaginal bleeding (bright red clot passed with some scant red 

blood with wiping this am), fetal movement normal, no new complaints, no loss 

of fluid, no contractions





Objective





- Vital Signs


Vital Signs: 


 Vital Signs - 12hr











  17





  04:23 04:24 04:30


 


Temperature   97.4 F L


 


Pulse Rate 89 90 


 


Respiratory   16





Rate   


 


Blood Pressure  85/51 


 


O2 Sat by Pulse 97  





Oximetry   














  17





  07:58 08:04


 


Temperature  98.2 F


 


Pulse Rate 86 


 


Respiratory  18





Rate  


 


Blood Pressure 92/57 


 


O2 Sat by Pulse  97





Oximetry  














- Exam


Lungs: Normal air movement


Abdomen: Present: normal appearance, soft


FHR: auscultation normal, category 1 (due to gestational age difficut to trace 

at times . occ variables noted)





- Labs


Labs: 


 Abnormal Labs











  17





  20:09 00:15 06:15


 


RBC  3.64 L  


 


MCHC  35 H  


 


Mono % (Auto)  8.3 H  


 


Magnesium    4.20 H


 


Urine pH   8.0 H 














  17





  16:31 23:12 06:50


 


RBC   


 


MCHC   


 


Mono % (Auto)   


 


Magnesium  4.20 H  4.20 H  4.50 H


 


Urine pH

## 2017-07-24 RX ADMIN — DOCUSATE SODIUM PRN MG: 100 CAPSULE, LIQUID FILLED ORAL at 10:28

## 2017-07-24 RX ADMIN — SODIUM CHLORIDE, SODIUM LACTATE, POTASSIUM CHLORIDE, AND CALCIUM CHLORIDE SCH MLS/HR: .6; .31; .03; .02 INJECTION, SOLUTION INTRAVENOUS at 14:30

## 2017-07-24 RX ADMIN — Medication SCH EACH: at 10:28

## 2017-07-24 NOTE — ULTRASOUND REPORT
BIOPHYSICAL PROFILE:



INDICATION: Fetal well being.



COMPARISON: None similar.



TECHNIQUE:  Transabdominal ultrasound with Doppler interrogation.



0 - Fetal breathing movements



2 - Fetal movements



2 - Fetal posture and tone



2 - Qualitative amniotic fluid volume



6 - TOTAL SCORE OF POSSIBLE 8



Fetal Heart Rate (bpm) 145

## 2017-07-24 NOTE — EVENT NOTE
Date: 07/24/17 (fetal evaluation ordered)


 Spoke with Dr. Daniel Allen. Discussed recommendation from office visit that 

growth and doppler studies were recommended @ 24 weeks. Tests ordered to be 

done this AM.

## 2017-07-24 NOTE — ULTRASOUND REPORT
Umbilical cord Doppler imaging:



Fetal well-being. Estimated gestational age 24 weeks one day.



Imaging of 3 free umbilical cord loops demonstrate an average systolic 

to diastolic ratio of 5.98. This slightly greater than the 95 

percentile range. The waveform is persistent.



Average imaging of 3 loops of cord demonstrates a resistive index of 

0.83 which also is greater than 95 percentile range.

## 2017-07-24 NOTE — PROGRESS NOTE
Assessment and Plan





- Patient Problems


(1) Placenta previa antepartum in second trimester


Onset Date: Unknown   Current Visit: Yes   Status: Acute   


Plan to address problem: 


Pt in very good spirits this AM. Fetal evaluation done Completed Results not 

available at time of note. VSS Pt denies any new bright red bleeding since 

early . Reports good FM. Denies ctx or LOF.  aware of pt. 

Possible d/c today if cleared to go by [facility].








Subjective





- Subjective


Date of service: 17 (ffetal eval ordered for this AM)


Principal diagnosis: IUp @ 24w1d, previa


Interval history: 


EDC Confirmation:  2017


Gestational Age:  11 1/7 weeks





Past Pregnancy History 


   :      2


   Term Births:      1


   Living Children:   1





Pregnancy # 1


   Delivery date:     3/2006


   Weeks Gestation:   term


   Delivery type:     


   Anesthesia type:     epidural


   Delivery location:     [facility]


   Infant Sex:      Female


   Birth weight:      5-14








Past Medical History:


   Reviewed history from 2017 and no changes required:


      nonhodgekins lymphoma 3/2010 had chemo and radiation


      last radiation/chemo was 


      PTSD


      Anxiety/OCD





Past Surgical History:


   Reviewed history from 2017 and no changes required:


      


      lymphectomy


      port placement in and out


      Breast Surgery: 


      bone marrow aspiration


      Denies any prior history of complications from anesthesia. 


      Breast Biopsy: 2016 benign





Past Medical History 


Abnormal PAP: negative


HUEY Exposure: negative


Infertility: negative


Uterine Anomaly: negative


Uterine Surgery (not C/S): negative


Other Gynecologic Problems: negative





Social Hx: Patient is 





Smoking History:


Patient has never smoked.








Infection History 


Hx of STD: none


HIV Risk Eval: low risk


Hepatitis B Risk Eval: low risk


Personal hx. of genital herpes: no


Partner hx. of genital herpes: no


Rash, Viral, or Febrile illness since last LMP? no


Varicella/Chicken Pox Status: Previous Disease


TB Risk: no





Genetic History 


ADVANCED MATERNAL AGE


Congenital Heart Defect:


    Mom: no  Dad: no


Canavan Disease:


    Mom: no  Dad: no


Thalassemia


    Mom: no  Dad: no


Neural Tube Defect


    Mom: no  Dad: no


Down's Syndrome


    Mom: no  Dad: no


Cheko-Sachs


    Mom: no  Dad: no


Sickle Cell Disease/Trait


    Mom: no  Dad: no


Hemophilia


    Mom: no  Dad: no


Muscular Dystrophy


    Mom: no  Dad: no


Cystic Fibrosis


    Mom: no  Dad: no


[place] Chorea


    Mom: no  Dad: no


Mental Retardation


    Mom: no  Dad: no


Fragile X


    Mom: no  Dad: no


Other Genetic/Chromosomal Disorder


    Mom: no  Dad: no


Child w/other birth defect


    Mom: no  Dad: no





Enviromental Exposures 


Xray Exposure: no


Medication, drug, or alcohol use since LMP: no


Chemical/Other Exposure: no


Exposure to Cat Liter: no


Hx of Parvovirus (Fifth Disease): no


Occupational Exposure to Children: none


Active Medications (reviewed today):


ZOFRAN ODT 8 MG TBDP (ONDANSETRON) 1 po q12hrs prn


TRNESSA () 





Current Allergies (reviewed today):


No known allergies


Laboratory Results 





Routine Urinalysis 


Leukocytes: negative


Nitrite: negative


Urobilinogen: negative


Protein: negative


Blood: negative


Ketone: negative


Bilirubin: negative


Glucose: negative


Urine HCG: positive





Review of Systems 





General


     Denies fever, chills, sweats, anorexia, fatigue, weakness, malaise, weight 

loss and sleep disorder.





Prenatal


     Denies nausea, vomiting, headache, swelling of legs, abdominal pain, 

vaginal discharge, vaginal bleeding and contractions.








     Denies vaginal discharge, incontinence, dysuria, hematuria, urinary 

frequency, amenorrhea, menorrhagia, abnormal vaginal bleeding, pelvic pain, 

genital sores, decreased libido, painful periods, painful sex, urinary urgency, 

hot flashes, vaginal dryness, vaginal itching and vaginal odor.





CV


     Denies chest pains, palpitations, syncope, dyspnea on exertion, orthopnea, 

PND and peripheral edema.





Resp


     Denies cough, dyspnea at rest, excessive sputum, hemoptysis, wheezing and 

pleurisy.





GI


     Denies nausea, vomiting, diarrhea, constipation, change in bowel habits, 

abdominal pain, melena, hematochezia, jaundice, gas/bloating, indigestion/

heartburn, dysphagia and odynophagia.





Endo


     Denies cold intolerance, heat intolerance, polydipsia, polyphagia, 

polyuria and unusual weight change.





Breast


     Denies left breast lump, right breast lump, nipple discharge, bloody 

discharge from nipple, breast pain, abnormal mammogram and breast enlargement.





MS


     Denies back pain, joint pain, joint swelling, muscle cramps, muscle 

weakness, stiffness, arthritis, sciatica, restless legs, leg pain at night and 

leg pain with exertion.





Derm


     Denies rash, itching, dryness and suspicious lesions.





Neuro


     Denies paralysis, paresthesias, headache, seizures, tremors, vertigo, 

transient blindness, frequent falls, frequent headaches and difficulty walking.





Psych


     Denies depression, anxiety, irritability and mood swings.





Eyes


     Denies blurring, diplopia, irritation, discharge, vision loss, eye pain 

and photophobia.





ENT


     Denies earache, ear discharge, tinnitus, decreased hearing, nasal 

congestion, nosebleeds, sore throat and hoarseness.





Allergy


     Denies urticaria, allergic rash, hay fever and recurrent infections.





Heme


     Denies abnormal bruising, bleeding and enlarged lymph nodes.





PHYSICAL EXAM 


HEENT: PERRLA, normal conjunctiva, external nose and nasal mucosa normal, 

oropharynx clear


Neck/Thyroid: supple, thyroid normal


Skin no significant abnormal lesions or rashes


Chest: respiratory effort normal, clear to auscultation


Breasts: normal without skin changes or masses


CV: regular, normal S1-S2, no murmur, no rub, no gallop


Abdomen: normal bowel sounds, soft, nontender, no HSM


Musculoskeletal: grossly normal ROM in joints, no joint tenderness or muscle 

weakness


Neuro: grossly normal DTRs, sensation, strength, cranial nerves


Extremities: no clubbing, cyanosis, or edema





GYN Exams 


Vulva/Vagina: No lesions, normal BUS, normal rugae


Cervix: No lesions; no cervical motion tenderness


Uterus: normal size and position, midline, mobile


Adnexae: no masses or tenderness


Rectovaginal: no masses or tenderness





Patient reports: vaginal bleeding (bright red clot passed with some scant red 

blood with wiping this am), fetal movement normal, no new complaints, no loss 

of fluid, no contractions





Objective





- Vital Signs


Vital Signs: 


 Vital Signs - 12hr











  17





  00:57 01:00 08:11


 


Temperature  97.3 F L 


 


Pulse Rate 85  91 H


 


Respiratory  18 





Rate   


 


Blood Pressure 94/58  105/65


 


Blood Pressure   





[Right Arm]   














  17





  08:14


 


Temperature 97.9 F


 


Pulse Rate 


 


Respiratory 91 H





Rate 


 


Blood Pressure 


 


Blood Pressure 105/65





[Right Arm] 














- Exam


Breasts: deferred


Cardiovascular: Regular rate


Lungs: Normal air movement


Abdomen: Present: normal appearance, soft.  Absent: distention, tenderness


Uterus: Present: normal


FHR: auscultation normal, other (NSTs reactive Qshift)





- Labs


Labs: 


 Abnormal Labs











  1717





  20:09 00:15 06:15


 


RBC  3.64 L  


 


MCHC  35 H  


 


Mono % (Auto)  8.3 H  


 


Magnesium    4.20 H


 


Urine pH   8.0 H 














  17





  16:31 23:12 06:50


 


RBC   


 


MCHC   


 


Mono % (Auto)   


 


Magnesium  4.20 H  4.20 H  4.50 H


 


Urine pH

## 2017-07-24 NOTE — ULTRASOUND REPORT
OB ULTRASOUND GREATER THAN 14 WEEKS



INDICATION: Fetal well being.



COMPARISON: 7/20/2017



TECHNIQUE: Transabdominal grayscale ultrasound with Doppler 

interrogation.



Gestation: Singer



Fetal Position: Cephalic



Amniotic Fluid: WNL (7-24 cm)

  LUIS ANTONIO = 19.9 cm



Fetal Heart Rate:

  145 BPM



 BPD: 5.4 cm = 22 w 3 d



 HC: 20.6 cm = 22 w 5 d



 AC: 17.6 cm = 22 w 3 d



 FL: 4.1 cm = 23 w 2 d



HC/AC Ratio: 1.17



Cephalic Index: 76.7



Estimated Fetal Weight: 538 grams



Clinical age = 24 w 1 d      EDC: 11/12/2017



US Gest. Age = 22 w 5 d      EDC: 11/22/2017 





CONCLUSION: 



1.  Single, viable intrauterine gestation with ultrasound estimated age 

of 22 weeks and 5 days and EDC of 11/22/2017, currently in cephalic lie 

with details, as above.



2.  A small echogenic area in the fetal stomach now incidentally noted, 

possibly debris/pseudomass, of doubtful clinical significance, though 

may be followed up on subsequent exams.



Thank you for the opportunity to participate in this patient's care.

## 2017-07-25 VITALS — DIASTOLIC BLOOD PRESSURE: 62 MMHG | SYSTOLIC BLOOD PRESSURE: 103 MMHG

## 2017-07-25 NOTE — PROGRESS NOTE
Assessment and Plan





ASSESSMENT


1. IUP @ 24 2/7weeks


2. Placenta previa , bleeding resolved


3. Suspected IUGR ( EFW 5th percentile , AC 5th percentile), normal LUIS ANTONIO, 

elevated S/D ratio





RECOMMENDATIONS


1. For NST this morning 


2. Home if stable ,reassuring  testing 


3. Will contact patient to arrange for her f/uwith [facility]


4. pt is aware to contact OB to schedule her next appt








Subjective





- Subjective


Date of service: 17


Principal diagnosis: IUp @ 24w2d, previa


Patient reports: fetal movement normal (denied any active bleeding yesterday , 

currently asymptomatic ), no new complaints, no loss of fluid, no contractions





Objective





- Vital Signs


Vital Signs: 


 Vital Signs - 12hr











  17





  23:42 03:35


 


Temperature 98.3 F 97.9 F


 


Pulse Rate 80 90


 


Pulse Rate [ 80 90





Apical]  


 


Respiratory 20 18





Rate  


 


Blood Pressure 97/56 103/59


 


Blood Pressure 97/56 103/59





[Right Arm]  














- Exam


Abdomen: Present: normal appearance, soft


FHR: other (nurse at bedside for NST)


Uterine Contraction Monitor Mode: External


Uterine Contraction Pattern: Absent





- Labs


Labs: 


 Abnormal Labs











  17





  20:09 00:15 06:15


 


RBC  3.64 L  


 


MCHC  35 H  


 


Mono % (Auto)  8.3 H  


 


Magnesium    4.20 H


 


Urine pH   8.0 H 














  17





  16:31 23:12 06:50


 


RBC   


 


MCHC   


 


Mono % (Auto)   


 


Magnesium  4.20 H  4.20 H  4.50 H


 


Urine pH   














- Results


US- obstetric: report reviewed (SIUP 538gm Hadlock 5% , S/D ratio reported as 4-

6 ( unable to see images of dopplers )Cephalic FI 19.9cm, BPP 6/8 ( -2 breathing

))

## 2017-07-25 NOTE — DISCHARGE SUMMARY
Providers





- Providers


Date of Admission: 


17 23:53





Date of discharge: 17


Attending physician: 


ROLA MENDEZ





 





17 21:15


Consult to Physician [CONS] Routine 


   Consulting Provider: NORM FLORES


   Reason For Exam:  bleeding Known previa


   Place consult to:: ROBERT SOOD


   Notified:: SORAIDA


   Phone number called:: [phone]


   Was contact made?: Yes


   If yes, spoke with:: SORAIDA


   Time called:: 13:30


   Comment:: MAGALY SERRANO SPOKE WITH PT.





17 21:16


Consult to Physician [CONS] Routine 


   Consulting Provider: ABRAM DUPREE


   Reason For Exam: bleeding previa @ 23weeks


   Place consult to:: DAVE


   Notified:: YOUSIF


   Phone number called:: [phone]


   Was contact made?: Yes


   If yes, spoke with:: YOUSIF


   Time called:: 13:30


   Comment::  ON CALL











Primary care physician: 


ROLA MENDEZ








Hospitalization


Reason for admission: observation (placenta previa)


Hospital course: 


 observation for placenta previa, bleeding episode #2





Condition at discharge: Good


Disposition: DC-01 TO HOME OR SELFCARE





- Discharge Diagnoses


(1) Elderly multigravida in second trimester


Status: Acute   





(2) Placenta previa antepartum in second trimester


Status: Acute   





(3) 24 weeks gestation of pregnancy


Status: Acute   





Plan





- Provider Discharge Summary


Activity: routine, other (pelvic rest - nothing in vagina.)


Diet: routine


Instructions: routine


Additional instructions: 


[]  Smoking cessation referral if applicable(refer to patient education folder 

for contact #)


[]  Refer to [facility] Booklet








Call your doctor immediately for:


* Fever > 100.5


* Heavy vaginal bleeding 


* Severe persistent headache


* Shortness of breath


* Reddened, hot, painful area to leg or breast














- Follow up plan


Follow up: 


ROLA MENDEZ MD [Primary Care Provider] - 17 (You have an 

appointment scheduled Monday @ 1045 in our [place] office  for your diabetic 

screening test. Please call if you have any new bleeding or any questions. )

## 2017-07-27 ENCOUNTER — HOSPITAL ENCOUNTER (INPATIENT)
Dept: HOSPITAL 5 - LD | Age: 37
LOS: 2 days | Discharge: HOME | DRG: 782 | End: 2017-07-29
Attending: OBSTETRICS & GYNECOLOGY | Admitting: OBSTETRICS & GYNECOLOGY
Payer: COMMERCIAL

## 2017-07-27 DIAGNOSIS — O36.5920: Primary | ICD-10-CM

## 2017-07-27 DIAGNOSIS — O09.522: ICD-10-CM

## 2017-07-27 DIAGNOSIS — O44.12: ICD-10-CM

## 2017-07-27 DIAGNOSIS — Z3A.24: ICD-10-CM

## 2017-07-27 LAB
BASOPHILS NFR BLD AUTO: 0.3 % (ref 0–1.8)
EOSINOPHIL NFR BLD AUTO: 1.2 % (ref 0–4.3)
HCT VFR BLD CALC: 36.4 % (ref 30.3–42.9)
HGB BLD-MCNC: 12.5 GM/DL (ref 10.1–14.3)
MCH RBC QN AUTO: 32 PG (ref 28–32)
MCHC RBC AUTO-ENTMCNC: 34 % (ref 30–34)
MCV RBC AUTO: 94 FL (ref 79–97)
PLATELET # BLD: 193 K/MM3 (ref 140–440)
RBC # BLD AUTO: 3.87 M/MM3 (ref 3.65–5.03)
WBC # BLD AUTO: 11.5 K/MM3 (ref 4.5–11)

## 2017-07-27 PROCEDURE — 85025 COMPLETE CBC W/AUTO DIFF WBC: CPT

## 2017-07-27 PROCEDURE — 86850 RBC ANTIBODY SCREEN: CPT

## 2017-07-27 PROCEDURE — 86762 RUBELLA ANTIBODY: CPT

## 2017-07-27 PROCEDURE — 86900 BLOOD TYPING SEROLOGIC ABO: CPT

## 2017-07-27 PROCEDURE — 36415 COLL VENOUS BLD VENIPUNCTURE: CPT

## 2017-07-27 PROCEDURE — 76820 UMBILICAL ARTERY ECHO: CPT

## 2017-07-27 PROCEDURE — G0379 DIRECT REFER HOSPITAL OBSERV: HCPCS

## 2017-07-27 PROCEDURE — 82950 GLUCOSE TEST: CPT

## 2017-07-27 PROCEDURE — 76819 FETAL BIOPHYS PROFIL W/O NST: CPT

## 2017-07-27 PROCEDURE — 86901 BLOOD TYPING SEROLOGIC RH(D): CPT

## 2017-07-27 RX ADMIN — ZOLPIDEM TARTRATE PRN MG: 10 TABLET, FILM COATED ORAL at 21:16

## 2017-07-27 RX ADMIN — SODIUM CHLORIDE, SODIUM LACTATE, POTASSIUM CHLORIDE, AND CALCIUM CHLORIDE SCH MLS/HR: .6; .31; .03; .02 INJECTION, SOLUTION INTRAVENOUS at 20:25

## 2017-07-27 NOTE — HISTORY AND PHYSICAL REPORT
History of Present Illness


Date of examination: 17 (pt returns to [facility] from [facility] for continuous 

monitoring)


Date of admission: 


17 16:00





History of present illness: 


EDC Confirmation:  2017


Gestational Age:  11 1/7 weeks





Past Pregnancy History 


   :      2


   Term Births:      1


   Living Children:   1





Pregnancy # 1


   Delivery date:     3/2006


   Weeks Gestation:   term


   Delivery type:     


   Anesthesia type:     epidural


   Delivery location:     [facility]


   Infant Sex:      Female


   Birth weight:      5-14








Past Medical History:


   Reviewed history from 2017 and no changes required:


      nonhodgekins lymphoma 3/2010 had chemo and radiation


      last radiation/chemo was 


      PTSD


      Anxiety/OCD





Past Surgical History:


   Reviewed history from 2017 and no changes required:


      


      lymphectomy


      port placement in and out


      Breast Surgery: 


      bone marrow aspiration


      Denies any prior history of complications from anesthesia. 


      Breast Biopsy: 2016 benign





Past Medical History 


Abnormal PAP: negative


HUEY Exposure: negative


Infertility: negative


Uterine Anomaly: negative


Uterine Surgery (not C/S): negative


Other Gynecologic Problems: negative





Social Hx: Patient is 





Smoking History:


Patient has never smoked.








Infection History 


Hx of STD: none


HIV Risk Eval: low risk


Hepatitis B Risk Eval: low risk


Personal hx. of genital herpes: no


Partner hx. of genital herpes: no


Rash, Viral, or Febrile illness since last LMP? no


Varicella/Chicken Pox Status: Previous Disease


TB Risk: no





Genetic History 


ADVANCED MATERNAL AGE


Congenital Heart Defect:


    Mom: no  Dad: no


Canavan Disease:


    Mom: no  Dad: no


Thalassemia


    Mom: no  Dad: no


Neural Tube Defect


    Mom: no  Dad: no


Down's Syndrome


    Mom: no  Dad: no


Cheko-Sachs


    Mom: no  Dad: no


Sickle Cell Disease/Trait


    Mom: no  Dad: no


Hemophilia


    Mom: no  Dad: no


Muscular Dystrophy


    Mom: no  Dad: no


Cystic Fibrosis


    Mom: no  Dad: no


[place] Chorea


    Mom: no  Dad: no


Mental Retardation


    Mom: no  Dad: no


Fragile X


    Mom: no  Dad: no


Other Genetic/Chromosomal Disorder


    Mom: no  Dad: no


Child w/other birth defect


    Mom: no  Dad: no





Enviromental Exposures 


Xray Exposure: no


Medication, drug, or alcohol use since LMP: no


Chemical/Other Exposure: no


Exposure to Cat Liter: no


Hx of Parvovirus (Fifth Disease): no


Occupational Exposure to Children: none


Active Medications (reviewed today):


ZOFRAN ODT 8 MG TBDP (ONDANSETRON) 1 po q12hrs prn


TRNESSA () 





Current Allergies (reviewed today):


No known allergies


Laboratory Results 





Routine Urinalysis 


Leukocytes: negative


Nitrite: negative


Urobilinogen: negative


Protein: negative


Blood: negative


Ketone: negative


Bilirubin: negative


Glucose: negative


Urine HCG: positive





Review of Systems 





General


     Denies fever, chills, sweats, anorexia, fatigue, weakness, malaise, weight 

loss and sleep disorder.





Prenatal


     Denies nausea, vomiting, headache, swelling of legs, abdominal pain, 

vaginal discharge, vaginal bleeding and contractions.








     Denies vaginal discharge, incontinence, dysuria, hematuria, urinary 

frequency, amenorrhea, menorrhagia, abnormal vaginal bleeding, pelvic pain, 

genital sores, decreased libido, painful periods, painful sex, urinary urgency, 

hot flashes, vaginal dryness, vaginal itching and vaginal odor.





CV


     Denies chest pains, palpitations, syncope, dyspnea on exertion, orthopnea, 

PND and peripheral edema.





Resp


     Denies cough, dyspnea at rest, excessive sputum, hemoptysis, wheezing and 

pleurisy.





GI


     Denies nausea, vomiting, diarrhea, constipation, change in bowel habits, 

abdominal pain, melena, hematochezia, jaundice, gas/bloating, indigestion/

heartburn, dysphagia and odynophagia.





Endo


     Denies cold intolerance, heat intolerance, polydipsia, polyphagia, 

polyuria and unusual weight change.





Breast


     Denies left breast lump, right breast lump, nipple discharge, bloody 

discharge from nipple, breast pain, abnormal mammogram and breast enlargement.





MS


     Denies back pain, joint pain, joint swelling, muscle cramps, muscle 

weakness, stiffness, arthritis, sciatica, restless legs, leg pain at night and 

leg pain with exertion.





Derm


     Denies rash, itching, dryness and suspicious lesions.





Neuro


     Denies paralysis, paresthesias, headache, seizures, tremors, vertigo, 

transient blindness, frequent falls, frequent headaches and difficulty walking.





Psych


     Denies depression, anxiety, irritability and mood swings.





Eyes


     Denies blurring, diplopia, irritation, discharge, vision loss, eye pain 

and photophobia.





ENT


     Denies earache, ear discharge, tinnitus, decreased hearing, nasal 

congestion, nosebleeds, sore throat and hoarseness.





Allergy


     Denies urticaria, allergic rash, hay fever and recurrent infections.





Heme


     Denies abnormal bruising, bleeding and enlarged lymph nodes.





PHYSICAL EXAM 


HEENT: PERRLA, normal conjunctiva, external nose and nasal mucosa normal, 

oropharynx clear


Neck/Thyroid: supple, thyroid normal


Skin no significant abnormal lesions or rashes


Chest: respiratory effort normal, clear to auscultation


Breasts: normal without skin changes or masses


CV: regular, normal S1-S2, no murmur, no rub, no gallop


Abdomen: normal bowel sounds, soft, nontender, no HSM


Musculoskeletal: grossly normal ROM in joints, no joint tenderness or muscle 

weakness


Neuro: grossly normal DTRs, sensation, strength, cranial nerves


Extremities: no clubbing, cyanosis, or edema





GYN Exams 


Vulva/Vagina: No lesions, normal BUS, normal rugae


Cervix: No lesions; no cervical motion tenderness


Uterus: normal size and position, midline, mobile


Adnexae: no masses or tenderness


Rectovaginal: no masses or tenderness








Past History





- Obstetrical History


Expected Date of Delivery: 17


Actual Gestation: 24 Week(s) 4 Day(s) 


: 2


Para: 1


Hx # Term Pregnancies: 1


Number of Living Children: 1





Medications and Allergies


 Allergies











Allergy/AdvReac Type Severity Reaction Status Date / Time


 


No Known Drug Allergies Allergy  Unknown Verified 17 21:22











 Home Medications











 Medication  Instructions  Recorded  Confirmed  Last Taken  Type


 


busPIRone [Buspar] 15 mg PO BID 17 14:30 History











Active Meds: 


Active Medications





Acetaminophen (Tylenol)  650 mg PO Q4H PRN


   PRN Reason: Pain MILD(1-3)/Fever >100.5/HA


Diphenhydramine HCl (Benadryl)  25 mg PO Q6H PRN


   PRN Reason: Itching


Docusate Sodium (Colace)  100 mg PO Q12H PRN


   PRN Reason: Constipation


Lactated Ringer's (Lactated Ringers)  1,000 mls @ 125 mls/hr IV AS DIRECT MARCO


Multivitamins/Iron/Calcium (Prenatal Vitamin)  1 each PO QDAY MARCO


Ondansetron HCl (Zofran)  4 mg IV Q6H PRN


   PRN Reason: Nausea And Vomiting


Pseudoephedrine HCl (Sudafed)  30 mg PO Q4H PRN


   PRN Reason: Nasal Congestion


Simethicone (Mylicon)  80 mg PO Q6H PRN


   PRN Reason: Gas pain


Sodium Chloride (Deep Sea)  2 spray NS Q4H PRN


   PRN Reason: Congestion


Zolpidem Tartrate (Ambien)  10 mg PO ONCE PRN


   PRN Reason: Sleep











- Vital Signs


Vital signs: 


 Vital Signs











Pulse BP Pulse Ox


 


 116 H  104/58   97 


 


 17 16:58  17 16:58  17 16:58








 











Temp Pulse Resp BP Pulse Ox


 


    94 H     104/58   94 


 


    17 17:03     17 16:58  17 17:03














- Physical Exam


Breasts: Positive: deferred


Cardiovascular: Regular rate, Normal S1, Normal S2


Lungs: Positive: Normal air movement


Abdomen: Positive: normal appearance, soft, normal bowel sounds.  Negative: 

distention, tenderness


Genitourinary (Female): Positive: normal external genitalia


Vulva: both: normal


Vagina: Positive: normal moisture.  Negative: discharge


Cervix: Negative: lesion, discharge


Uterus: Positive: normal size, normal contour


Adnexa: both: normal


Anus/Rectum: Positive: normal perianal skin, heme negative.  Negative: rectal 

mass, hemorrhoids


Extremities: Positive: normal


Deep Tendon Reflex Grade: Normal +2





- Obstetrical


FHR: category 1


Uterine Contraction Monitor Mode: External


Uterine Contraction Pattern: Absent


Uterine Tone Measurement Phase: Resting





Results


All other labs normal.


Prenatal Lab Monitoring, Entry, and Tracking 


Initial Labs


Blood Type: A+     Date: 2017


Rh Type: +     Date: 2017


Antibody Screen: negative     Date: 2017


HBsAg: negative     Date: 2017


RPR/VDRL: negative     Date: 2017


Hep C: negative     Date: 2017


HIV: negative     Date: 2017





Assessment and Plan


Rubella status missing from labs ordered to be draw today at visit.


Will have 1hr GTT drawn in the AM








- Patient Problems


(1) Non-reassuring fetal status


Onset Date: ~17   Current Visit: Yes   Status: Acute   


Plan to address problem: 


Pt sent over from [facility] today for continuous fetal monitoring. They did Doppler 

Studies in their office that were abnormal. These are to be repeated in the AM. 

Labs ordered, regular diet, monitor for any vaginal bleeding.  aware 

of admission. Spoke with Dr. Gutiérrez to confirm consult. 


Pt aware of POC and agrees.

## 2017-07-28 RX ADMIN — ZOLPIDEM TARTRATE PRN MG: 10 TABLET, FILM COATED ORAL at 21:33

## 2017-07-28 RX ADMIN — SODIUM CHLORIDE, SODIUM LACTATE, POTASSIUM CHLORIDE, AND CALCIUM CHLORIDE SCH MLS/HR: .6; .31; .03; .02 INJECTION, SOLUTION INTRAVENOUS at 20:00

## 2017-07-28 RX ADMIN — SODIUM CHLORIDE, SODIUM LACTATE, POTASSIUM CHLORIDE, AND CALCIUM CHLORIDE SCH MLS/HR: .6; .31; .03; .02 INJECTION, SOLUTION INTRAVENOUS at 04:24

## 2017-07-28 RX ADMIN — SODIUM CHLORIDE, SODIUM LACTATE, POTASSIUM CHLORIDE, AND CALCIUM CHLORIDE SCH MLS/HR: .6; .31; .03; .02 INJECTION, SOLUTION INTRAVENOUS at 10:27

## 2017-07-28 NOTE — ULTRASOUND REPORT
BIOPHYSICAL PROFILE:



INDICATION: Fetal well being.



COMPARISON: 07/24/2017.



TECHNIQUE:  Transabdominal ultrasound with Doppler interrogation.



2 - Fetal breathing movements



2 - Fetal movements



2 - Fetal posture and tone



2 - Qualitative amniotic fluid volume



8 - TOTAL SCORE OF POSSIBLE 8



Fetal Heart Rate (bpm) 154

## 2017-07-28 NOTE — ADMIT CRITERIA FORM
Admission Criteria Documentation: 





                  GENERAL ADMISSION CRITERIA


 


                                                                               

   (Place 'X' for any and all applicable criteria):





Admission is indicated for ANY ONE of the following:





[ ]I.     Hemodynamic instability as indicated by ANY ONE of the following(1)(2)

(3)(4)(5):


          [ ]a) Vital sign abnormality not readily corrected by appropriate 

treatment within 12 to 24 hours indicated by ANY ONE of the following: 


                 [ ]i)    Hypotension


                 [ ]ii)   Symptomatic Tachycardia unresponsive to treatment (eg

, analgesia, fluids, sedation as indicated)


                 [ ]iii)  Orthostatic vital sign changes unresponsive to 

treatment (eg, fluids)


          [ ]b) Vital sign abnormality that is severe indicated by ANY ONE of 

the following:


                 [ ]i)    Inadequate perfusion indicated by ANY ONE of the 

following:


                          [ ]1)   Lactic acidosis (greater than 2 mmol/L)


                          [ ]2)   New abnormal capillary refill (greater than 3 

seconds)


                          [ ]3)   Other metabolic acidosis (arterial pH less 

than 7.35) not otherwise explained


                          [ ]4)   Reduced urine output


                          [ ]5)   Altered mental status


                          [ ]6)   Myocardial Ischemia


                 [ ]v)    Mean arterial pressure[A] less than 60 mm Hg


                 [ ]vi)   Mean arterial pressure[A] less than 70 mm Hg after 30 

minutes of appropriate treatment (eg, fluid resuscitation)


                 [ ]vii)  IV inotropic or vasopressor medication required to 

maintain adequate blood pressure or perfusion


                 [ ]viii) Sustained heart rate greater than 120 beats per 

minute in adult or child 6 years or older[B]]


[ ]II.    Hypertension requiring inpatient treatment as indicated by ANY ONE of 

the following(6)(7)(8):


                 [ ]a)  SBP greater than 220 mm Hg or DBP greater than 120 mm 

Hg despite treatment


                 [ ]b)  SBP greater than 140 mm Hg or DBP greater than 100 mm 

Hg with evidence of acute end organ 


                         damage as indicated by ANY ONE of the following:


                         [ ]i)    Encephalopathy


                         [ ]ii)   Acute renal failure as indicated by new onset 

of ANY ONE of the following(9)(10)(11)(12)(13):


                                  [ ]1)  A 3-fold rise in serum creatinine from 

baseline


                                  [ ]2) Serum creatinine greater than 4 mg/dL (

354 micromoles/L) with acute rise greater than 0.5 mg/dL (44.2 micromoles/L)


                                  [ ]3)  Reduction of more than 75% in 

estimated glomerular filtration rate from baseline


                                  [ ]4) Estimated glomerular filtration rate 

less than 35 mL/min/1.73m2 (0.59 mL/sec/1.73m2) in child up to 18 years of age


                                  [ ]5) Cessation of urine output indicated by 

ALL of the following: 


                                        [ ]A.   Adequate volume status


                                        [ ]B.   Inadequate urine output as 

indicated by ANY ONE of the following:


                                                [ ]a.     Urine output less 

than 0.3 mL/kg/hr for 24 hours


                                                [ ]b.     Anuria (urine output 

less than 0.1 mL/kg/hr) for 12 hours 


                        [ ]iii)  Aortic dissection


                        [ ]iv)  Myocardial ischemia


                        [ ]v)   Left ventricular heart failure 


                        [ ]vi)  Retinal hemorrhage


                        [ ]vii) Other significant finding 


                 [ ]c)  Hypertension in child requiring inpatient treatment as 

indicated by ALL of the following(14)(15)(16):


                        [ ]i)    Outpatient treatment not effective, not 

available, or not appropriate 


                        [ ]ii)   SBP or DBP greater than 95th percentile for age


                        [ ]iii)  Evidence of acute end organ damage as 

indicated by ANY ONE of the following:


                                 [ ]1)   Altered mental status


                                 [ ]2)   Acute renal failure as indicated by 

new onset of ANY ONE of the following(9)(10)(11)(12)(13):


                                        [ ]A.    A 3-fold rise in serum 

creatinine from baseline


                                        [ ]B.    Serum creatinine greater than 

4 mg/dL (354 micromoles/L) with acute rise greater than 0.5 mg/dL (44.2 

micromoles/L)


                                        [ ]C.    Reduction of more than 75% in 

estimated glomerular filtration rate from baseline


                                        [ ]D.    Estimated glomerular 

filtration rate less than 35 mL/min/1.73m2 (0.59 mL/sec/1.73m2)in child up to 

18 years of age


                                        [ ]E.    Cessation of urine output 

indicated by ALL of the following:


                                                  [ ]a.   Adequate volume status


                                                  [ ]b.   Inadequate urine 

output as indicated by ANY ONE of the following:


                                                           [ ]1)  Urine output 

less than 0.3 mL/kg/hr for 24 hours 


                                                           [ ]2)  Anuria (urine 

output less than 0.1 mL/kg/hr) for 12 hours


                                                           [ ]3)  Severe 

headache


                                                           [ ]4)  Visual 

disturbance


                                                           [ ]5)  Retinal 

hemorrhage


                                                           [ ]6)  Other 

significant finding


[ ]III.   Acute cardiac or peripheral ischemia as indicated by ANY ONE of the 

following:


          [ ]a)  Acute coronary syndrome(17)(18)


          [ ]b)  Acute peripheral ischemia (eg, pulseless, cool, mottled, or 

cyanotic extremity)(19) 


[ ]IV.     Cardiac arrhythmias or findings of immediate concern indicated by 

ANY ONE of the following(20)(21):


           [ ]a)  Heart rhythms that are inherently dangerous or unstable 

indicated by ANY ONE of the following(22)(23)(24):


                  [ ]i)    Resuscitated ventricular fibrillation or cardiac 

arrest 


                  [ ]ii)   Ventricular escape rhythm


                  [ ]iii)  Sustained ventricular tachycardia (30 seconds or 

more of ventricular rhythm at greater than 100 beats per minute)


                  [ ]iv)  Nonsustained ventricular tachycardia and ANY ONE of 

the following:


                          [ ]1)   Suspected cardiac ischemia as cause or 

consequence of ventricular tachycardia


                          [ ]2)   In setting of acute myocarditis


           [ ]b)  Unstable cardiac conduction defects indicated by ANY ONE of 

the following(24)(25)(26):


                  [ ]i)    Type II second-degree atrioventricular block 


                  [ ]ii)   Third-degree atrioventricular block


                  [ ]iii)  New-onset left bundle branch block with suspected 

myocardial ischemia


           [ ]c)  Any heart rhythm and ANY ONE of the following(22)(23)(27)(28)(

29):


                  [ ] i)    Continuous long-term ECG monitoring needed (eg, 

initiation of drug requiring monitoring for more than 24 hours)


                  [ ] ii)   Patient has automatic implanted cardioverter 

defibrillator that is repeatedly firing, malfunctioning, 


                            or in need of immediate adjustment of settings 

beyond the scope of ambulatory or observation care. 


           [ ]d)  Heart rhythms of concern due to ANY ONE of the following:


                  [ ]i)    Hypotension


                  [ ]ii)   Respiratory distress


                  [ ]iii)  Association with other significant symptoms (eg, 

bradycardia with syncope or ongoing dizziness, 


                          supraventricular tachycardia with chest pain) (27)(28)

(30)


[ ] V.          Severe heart failure as indicated by ANY ONE of the following (

31)(32):


            [ ]a)  Respiratory distress 


            [ ]b)  Hypotension


            [ ]c)  Anasarca (refractory to outpatient therapy) 


            [ ]d)  Cardiac arrhythmias of immediate concern 


            [ ]e)  Myocardial ischemia


[ ]VI.     Respiratory abnormalities, including ANY ONE of the following(33)(34)

(35)(36):


           [ ]a)  Respiratory rate greater than 30 breaths per minute 

unresponsive to treatment [A]


           [ ]b)  New saturation of arterial oxygen less than 90%


           [ ]c)  New partial pressure of carbon dioxide greater than 44 mm Hg (

5.9 kPa)


           [ ]d)  Supplemental oxygen or respiratory treatments needed that are 

new or not performable at other levels of care


           [ ]e)  New-onset cyanosis


           [ ]f)   Inability to protect airway


           [ ]g)  Chronic lung disease with severe deterioration (not 

responsive to emergency and observation care treatment as 


                   appropriate) as indicated by ANY ONE of the  following(34)(36

):


                     [ ]i)    SaO2 5% below baseline in patient with chronic 

hypoxemia


                     [ ]ii)   New requirement for supplemental oxygen to keep 

SaO2 at baseline or acceptable level


                     [ ]iii)  Required supplemental oxygen performable only in 

acute inpatient setting


                     [ ]iv)   Severe airflow or ventilation abnormalities


                     [ ]v)    Previously mobile patient unable to walk between 

rooms 


                     [ ]vi    Inability to eat or sleep due to dyspnea


                     [ ]vii)  Rapid rate of exacerbation onset 


                     [ ]viii) Altered mental status


 ]VII.  Severe airflow or ventilation abnormalities (not responsive to 

emergency and observation care treatment as appropriate) as 


         indicated by ANY  ONE of the following(33)(34)(35)(37):


          [ ]a)  PCO2 greater than 42 mm Hg (5.6 kPa) and pH less than 7.35 (new

)


          [ ]b)  Documented PCO2 increased more than 5 mm Hg (0.7 kPa) from 

disease baseline


          [ ]c)  Airflow measurements [B] less than 60% of previous best or 

predicted (eg, peak expiratory flow rate less than 300 L/minute)


                  despite intensive emergent treatment [C]


          [ ]d)  Required respiratory treatments that are performable only in 

acute inpatient setting


[ ]VIII.  Impending or actual respiratory arrest  ( Also use Respiratory 

Failure GRG  for severe respiratory disease and


          long-term mechanical ventilation patients)


[ ]IX.    Neurologic abnormalities, including ANY ONE of the following:


          [ ]a)  New findings that suggest ANY ONE of the following:


                 [ ]i)    CNS infection(38)


                 [ ]ii)   Cerebral bleeding, ischemia, or vasospasm(39)(40)


                 [ ]iii)  Increased intracranial pressure, hydrocephalus, or 

cerebral edema(41)(42)(43)


                 [ ]iv)  Spinal cord injury(44)


         [ ]b)  Uncontrolled seizures(45)


         [ ]c)  New-onset coma (eg, Jose coma scale score less than 9) or 

unexplained abnormal mental status 


                (eg, Jose coma scale score less than 14) [D](41)(46)(47)


[ ]X.   New-onset severe neurologic findings requiring inpatient care; examples 

include(42)(48)(49):


         [ ]a)  Papilledema


         [ ]b)  Cerebral edema


         [ ]c)  Mass effect on CT scan


[ ]XI.    Suspected acute intra-abdominal process with peritoneal signs, 

abdominal mass, or similar findings (50)(51)(52)


[ ]XII.   Severe physiologic disorder remaining after emergency or observation 

level care (as appropriate) as indicated by 


         ANY ONE of the following (53):


         [ ]a)  Significant dehydration


         [ ]b)  Diabetic ketoacidosis


         [ ]c)  Hyperglycemic hyperosmolar state (eg, osmolality greater than 

320 mOsm/kg (mmol/kg)


         [ ]d)  Hypoglycemia


         [ ]e)  Other (new) acid-base disorder with pH less than 7.35 or 

greater than 7.5(54)


         [ ]f)  Thyroid storm (55)


         [ ]g)  Myxedema coma (55)


[ ]XIII.  Abdominal abnormalities with ANY ONE of the following(56)(57):


         [ ]a)  Absent bowel sounds with complete ileus


         [ ]b)  Signs of intestinal obstruction or peritonitis [E]


         [ ]c)  Nausea and vomiting that cannot be controlled with outpatient 

or observation care


[ ]XIV. Acute renal failure as indicated by new onset of ANY ONE of the 

following(9)(10)(11)(12)(13):


          [ ]a)  A 3-fold rise in serum creatinine from baseline


          [ ]b)  Serum creatinine greater than 4 mg/dL (354 micromoles/L) with 

acute rise greater than 0.5 mg/dL (44.2 micromoles/L)


          [ ]c)  Reduction of more than 75% in estimated glomerular filtration 

rate from baseline


          [ ]d)  Estimated glomerular filtration rate less than 35 mL/min/

1.73m2 (0.59 mL/sec/1.73m2) in child up to 18 years of age


          [ ]e)  Cessation of urine output indicated by ALL of the following:


                 [ ]i)    Adequate volume status


                 [ ]ii)   Inadequate urine output as indicated by ANY ONE of 

the following:


                         [ ]1)   Urine output less than 0.3 mL/kg/hr for 24 

hours


                         [ ]2)   Anuria (urine output less than 0.1 mL/kg/hr) 

for 12 hours


[ ]XV.  Significant uremic complications as indicated by ANY ONE of the 

following(58)(59)(60):


          [ ]a)  Outpatient therapy is ineffective or not feasible for ANY ONE 

of the following:


                 [ ]i)    Severe heart failure 


                 [ ]ii)   Severehypertension 


                 [ ]iii)  Pleural effusion


                 [ ]iv)  Pericarditis or pericardial effusion


           [ ]b)  Cardiac arrhythmias of immediate concern     


           [ ]c)  Intractable nausea or vomiting


           [ ]d)  Recurrent seizures 


           [ ]e)  Encephalopathy


           [ ]f)   Bleeding abnormalities (eg, platelet dysfunction) with 

active (eg, gastrointestinal) bleeding 


           [ ]g)  Dialysis indicated before long-term access or ambulatory 

arrangements can be made


           [ ]h)  Significant metabolic or electrolyte abnormalities (eg, 

severe acidosis or hyperkalemia)


[ ]XVI.  High fever or other high-risk infection situation as indicated by ANY 

ONE of the following(61)(62)(63)(64):


           [ ]a)  Outpatient and observation care antimicrobial treatment 

unavailable, not effective, or not appropriate 


           [ ]b)  Documented bacteremia


           [ ]c)  Temperature greater than 40.5 degrees C (104.9 degrees F) (

oral)


           [ ]d)  Temperature greater than 39.5 degrees C (103.1 degrees F) (

oral) or less than 36 degrees C (96.8 degrees F)


                   (rectal) that does not respond to e  treatment and 

observation care


[ ] XVII.  Temperature less than 95 degrees F (35 degrees C)(rectal)(65)


[ ] XVIII. Severe nutritional abnormalities as indicated by ALL of the following

(66)(67):


           [ ]a)  Inability to tolerate or establish sufficient oral or other 

enteral nutrition in outpatient setting 


           [ ]b)  Parenteral nutrition regimen need that must be implemented on 

inpatient basis


[ ] XIX.  Severe electrolyte abnormalities indicated by ALL of the following(68)

(69)(70):


           [ ]a)  Electrolytes and associated findings are not as expected for 

patient baseline or acceptable treatment effects.


           [ ]b)  Severe abnormalities indicated by ANY ONE of the following:


                  [ ]i)  Sodium less than 130 mEq/L (mmol/L) (new)


                  [ ]ii)Sodium less than 135 mEq/L (mmol/L) with ANY ONE of the 

following:


                        [ ]1)   Uncorrectable (to near normal or chronic 

baseline) after trial of outpatient and emergency treatment


                        [ ]2)   Altered mental status


                        [ ]3)   Seizures


                        [ ]4)   Severe medical etiology requiring inpatient 

management (eg, heart failure, hypovolemia)                 


                  [ ]iii)  Sodium greater than 155 mEq/L (mmol/L)


                  [ ]iv)  Sodium greater than 150 mEq/L (mmol/L) with ANY ONE 

of the following:


                        [ ]1)   Uncorrectable (to near normal or chronic 

baseline) with outpatient and emergency treatment


                        [ ]2)   Altered mental status


                        [ ]3)   Seizures


                        [ ]4)   Severe medical etiology (eg, hypovolemia, 

diabetes insipidus)


                  [ ]v)   Potassium less than 2.5 mEq/L (mmol/L) despite 

outpatient and emergency treatment 


                  [ ]vi)  Potassium less than 3 mEq/L (mmol/L) with ANY ONE of 

the following:


                         [ ]1)   Weakness


                         [ ]2)   Cardiac abnormality (eg, arrhythmia, 

conduction disturbance)


                         [ ]3)   Cardiac ischemia


                         [ ]4)   Ileus


                         [ ]5)   Ongoing medical cause requiring inpatient 

management (eg, acute renal wasting or SIADH)


                         [ ]6)   Other severe symptoms                 


                 [ ]vii) Potassium greater than 6.5 mEq/L (mmol/L)


                 [ ]viii) Potassium greater than 5 mEq/L (mmol/L) with ANY ONE 

of the following:


                        [ ]1)   Uncorrectable (to near normal or chronic 

baseline) with outpatient and emergency treatment


                        [ ]2)   Severe ECG findings [F]


                        [ ]3)   Acute worsening of renal failure (creatinine 

greater than 2.5 mg/dL (221 micromoles/L) or significant elevation for age and 

size)


                        [ ]4)   Severe weakness


                        [ ]5)   Severe medical etiology (eg, hemolysis, 

infection, drug overdose)


                 [ ]ix)  Calcium less than 7 mg/dL (1.75 mmol/L) despite 

outpatient and emergency treatment (72)


                 [ ]x)  Calcium less than 8 mg/dL (2 mmol/L) with significant 

symptoms or findings; examples include(72):                       


                         [ ]1)   Altered mental status


                         [ ]2)   Muscle spasms


                         [ ]3)   Seizures


                         [ ]4)   Breathing difficulty


                         [ ]5)   Cardiac abnormality (eg, arrhythmia or 

conduction disturbance)


                [ ]xi)  Calcium greater than 14 mg/dL (3.5 mmol/L)(72)


                [ ]xii) Calcium greater than 12 mg/dL (3 mmol/L) with ANY ONE 

of the following(72):


                         [ ]1)   Uncorrectable (to near normal or chronic 

baseline) with outpatient and emergency treatment


                         [ ]2)   Significant dehydration or hypovolemia as 

indicated by ALL of the following(70)(73)(74):


                                  [ ]A.  Not resolved with initial treatments


                                  [ ]B.  Clinically significant dehydration as 

indicated by ANY ONE of the following:


                                           [ ]a. Vomiting refractory to 

outpatient treatment (ie, precluding oral rehydration)


                                           [ ]b. Inability to drink


                                           [ ]c. Hypernatremia or other 

electrolyte abnormality unable to be corrected with outpatient and emergency 

treatment


                                           [ ]d. Failure to remain hydrated 

with outpatient therapy 


                                           [ ]e. Reduced urine output


                                           [ ]f. Hypotension


                                           [ ]g. Serious cause for dehydration 

requiring acute hospitalization (eg, bowel obstruction, increased 


                                                  intracranial pressure, 

infectious cause)


                                           [ ]h. Child with ANY ONE of the 

following(75):


                                                  [ ]1)  Severe abdominal 

tenderness


                                                  [ ]2)  Adequate care not 

available at home     


                                                  [ ]3)  Severe dehydration (

greater than 9% loss of body weight)


                                                  [ ]4)  Significant symptoms 

or findings; examples include: 


                                                          [ ]A. Altered mental 

status


                                                          [ ]B. Cardiac 

abnormality (eg, arrhythmia, conduction disturbance) 


                                                          [ ]C. Malignant 

etiology requiring inpatient treatment


                [ ]xiii)  Phosphorus less than 1 mg/dL (0.32 mmol/L)


                [ ]xiv)  Phosphorus less than 1.5 mg/dL (0.48 mmol/L) with ANY 

ONE of the following:


                          [ ]1)   Patient unresponsive to outpatient and 

emergency treatment


                          [ ]2)   Significant symptoms or findings; examples 

include: 


                                  [ ]A.  Weakness


                                  [ ]B. Altered mental status 


                                  [ ]C. Breathing difficulty 


                                  [ ]D. Seizures


                                  [ ]E. Rhabdomyolysis


                [ ]xv)   Phosphorus greater than 10 mg/dL (3.2 mmol/L)


                [ ]xvi)  Phosphorus greater than 4.5 mg/dL (1.45 mmol/L) (new) 

with ANY ONE of the following:


                          [ ]1)   Severe medical etiology (eg, crush injury, 

acute renal failure)


                          [ ]2)   Associated hypocalcemia with significant 

findings; examples include: 


                                  [ ]A.    Neurologic symptoms


                                  [ ]B.    Altered mental status


                                  [ ]C.    Muscle spasms


                                  [ ]D.    Seizures


                                  [ ]E.    Breathing difficulty


                                  [ ]F.    Cardiac abnormality (eg, arrhythmia, 

conduction disturbance)


                [ ]xvii)   Magnesium less than 1 mg/dL (0.41 mmol/L)


                [ ]xviii)  Magnesium less than 1.5 mg/dL (0.62 mmol/L) with ANY 

ONE of the following:


                           [ ]1)   Patient unresponsive to outpatient and 

emergency treatment


                           [ ]2)   Associated hypocalcemia with significant 

findings; examples include: 


                                   [ ]A.    Altered mental status


                                   [ ]B.    Muscle spasms


                                   [ ]C.    Seizures


                                   [ ]D.    Breathing difficulty


                                   [ ]E.    Cardiac abnormality (eg, arrhythmia

, conduction disturbance)


                           [ ]3)   Associated hypokalemia (potassium less than 

3 mEq/L (mmol/L)) with risk of arrhythmia 


                [ ]xix)  Magnesium greater than 4 mEq/L (2 mmol/L) 


                [ ]xx)   Magnesium greater than 2.5 mEq/L (1.25 mmol/L) with 

significant symptoms or findings; examples include:


                          [ ]1)   Weakness


                          [ ]2)   Altered mental status


                          [ ]3)   Cardiac abnormality (eg, arrhythmia, 

conduction disturbance)


                          [ ]4)   Breathing difficulty


                          [ ]5)   Severe medical etiology (eg, renal failure, 

hypovolemia)


               [ ]xxi)   Uric acid greater than 20 mg/dL (1190 micromoles/L)(76)


               [ ]xxii)  Uric acid greater than 8 mg/dL (476 micromoles/L) with 

significant symptoms or findings of tumor


                          lysis syndrome; examples include(76):


                          [ ]1)   Creatinine greater than 1.5 times upper limit 

of normal


                          [ ]2)   Cardiac abnormality (eg, arrhythmia, 

conduction disturbance)


                          [ ]3)   Seizure


[ ]XX.   Acute blood loss causing significant abnormality as indicated by ANY 

ONE of the following(77)(78):


         [ ]a)  Hemoglobin less than 10 g/dL (100 g/L) (not baseline)


         [ ]b)  Hematocrit less than 30% (0.30) (not baseline)


         [ ]c)  Repeat hematocrit decreased more than 2% (0.02)


         [ ]d)  Uncontrolled bleeding


[ ]XXI. Severe anemia indicated by ANY ONE of the following(78)(79):


         [ ]a)  Altered mental status 


         [ ]b)  Chest pain


         [ ]c)  Exertional dyspnea 


         [ ]d)  Syncope


         [ ]e)  Other findings suggesting inadequate perfusion


         [ ]f)   Treatment with transfusion or volume replacement is 

ineffective at resolving ANY ONE of the following [G]:


                 [ ]i)    Tachycardia for age


                 [ ]ii)   Orthostatic vital sign changes as indicated by ANY ONE

 of the following(80):


                         [ ]1)    Fall in SBP of 20 mm Hg or more 1 to 3 

minutes after patient sits or stands from recumbent position


                         [ ]2)    Fall in DBP of 10 mm Hg or more 1 to 3 

minutes after patient sits or stands from recumbent position


[ ]XXII. High-risk low platelet count as indicated by ANY ONE of the following(

81)(82):


          [ ]a)  Severe or life-threatening bleeding (eg, intracranial, major 

gastrointestinal, or extensive mucosal bleeding),


                  with any reduced platelet count


          [ ]b)  Platelet count less than 20,000/mm3 (20 x109/L) with any 

active bleeding


          [ ]c)  Platelet count less than 10,000/mm3 (10 x109/L) with minor 

purpura or petechiae 


          [ ]d)  Platelet count less than 5000/mm3 (5 x109/L)


          [ ]e)  Low platelet count with hemolytic anemia


[ ]XXIII.  Disseminated intravascular coagulation(77)(83)


[ ]XXIV. Severe adverse drug or systemic toxin reaction requiring inpatient 

treatment; examples include(84)(85):          


          [ ]a)  Serotonin syndrome(86)


          [ ]b)  Neuroleptic malignant syndrome(86)


          [ ]c)  Cholinergic syndrome with severe symptoms (eg, bronchorrhea, 

weakness, mental status changes, seizures)


          [ ]d)  Sympathetic syndrome with severe symptoms (eg, seizures, 

mental status changes, cardiac dysrhythmias)


          [ ]e)   Anticholinergic syndrome


[ ]XXV.    Severe pain requiring acute inpatient management as indicated by ALL 

of the following (87)(88)(89):        


          [ ]a)  Continuous or frequent (eg, every 2 to 4 hours) parenteral 

analgesics required [H]


          [ ]b)  Rapid improvement expected from treatment or acute 

intervention (eg, surgery, anesthesia procedure)


[ ]XXVI.Severe behavioral health issues judged unmanageable at a lower level of 

care (eg, residential) in a 


          patient who is ANY ONE of the following(91)          


          [ ]a)  Acutely suicidal


          [ ]b)  A danger to self (eg, self-mutilating or suicidal behavior)


          [ ]c)  A danger to others (eg, assaultive or homicidal behavior)


          [ ]d)   Incapacitated because of grave disability (eg, inability to 

provide for self at lower level of care) (92) 


[X ]XXVII. Inpatient monitoring needed; examples include(1)(3)(87)(93)(94)(95)(

96):


          [ ]a)  Vital signs, neurologic signs, or vascular checks more 

frequently than every 4 hours


          [ ]b)  Cardiac or respiratory monitoring beyond the scope (eg, over 

24 hours) of observation care


          [ ]c)  Pulmonary artery catheter monitoring


          [ ]d)  Suspected compartment syndrome(97) (98)


          [ ]e)  Cerebral bleeding, hydrocephalus, or vasospasm monitoring


          [ ]f)   Increased intracranial pressure or cerebral edema monitoring


          [ X]g)  Fetal monitoring


[ ]XXVIII. Treatment requiring inpatient care; examples include:


          [ ]a)  IV fluid to replace significant ongoing losses (greater than 3 

L/m2 per day)(53)


          [ ]b)  High concentration oxygen (greater than 40%)(33)(99)(100)


          [ ]c)  Frequent respiratory therapy (more frequently than every 4 

hours) to maintain airflow rates greater 


                  than 60% of baseline(33)(99)(100)


          [ ]d)  Epidural analgesia(87)


          [ ]e)  IV anticoagulation, vasoactive, or antiarrhythmic medication(19

)(23)


          [ ]f)   Acute thrombolytics (generally require 24 hours of observation

)(101)(102)


[ ]XXIX.  Emergency procedures needed; examples include:


          [ ]a)  Emergency inpatient surgery


          [ ]b)  Temporary pacemaker placement(103)


          [ ]c)  Chest tube placement with active evacuation (eg, suction, 

drainage)(104)


          [ ]d)   Emergent cardioversion(105)


          [ ]e)  Emergent cardiac or vascular procedures (eg, cardiac 

catheterization, angioplasty) (17)(18)


          [ ]f)   Emergent dialysis access placement and institution(10)(106)


          [ ]g)  Emergent pericardiocentesis(107)


          [ ]h)  Emergent plasmapheresis or leukapheresis(83)


          [ ]i)   Emergent tracheostomy








The original [employer] content created by [employer] has been revised. 


The portions of the content which have been revised are identified through the 

use of italic text or in bold, and [employer] 


has neither reviewed nor approved the modified material. All other unmodified 

content is copyright  [employer].





Please see references footnoted in the original [employer] edition 

2016





Admission Criteria Met: Yes

## 2017-07-28 NOTE — ULTRASOUND REPORT
ULTRASOUND OB VELOCIMETRY UMBILICAL ARTERY



HISTORY: Fetal well-being.



FINDINGS: Transabdominal ultrasound with spectral Doppler interrogation 

was performed on 3 segments of the umbilical cord. Fetal heart rate 

measures 149 beats per minute.



The spectral waveforms demonstrate no evidence for loss of 

end-diastolic flow.



The S./D. ratio average measures 4.8.



The resistive index average measures 0.78.

## 2017-07-28 NOTE — PROGRESS NOTE
<ROSINA OLSON FELICIA - Last Filed: 07/28/17 07:35>





Assessment and Plan


 36y/o @ 24+5 weeks, IUGR 4th%, marginal previa w/hx of 2 significant bleeds 

and absent fetal doppler flow studies. AMFM to see patient this AM. Plan for 

cont EFM/TOCO, repeat doppler studies ordered for this morning 1hGTT in 

progress. pt denies vaginal bleeding, leaking, cramping or ctx. Continue 

current management. Dr. Rosado consulted. 








- Patient Problems


(1) 24 weeks gestation of pregnancy


Current Visit: Yes   Status: Acute   





(2) IUGR (intrauterine growth restriction) affecting care of mother


Current Visit: Yes   Status: Acute   


  QualifierTitle:    Fetus number: single or unspecified fetus   Trimester: 

second trimester   Qualified Code(s): O36.5920 - Maternal care for other known 

or suspected poor fetal growth, second trimester, not applicable or unspecified

   





(3) Placenta previa antepartum in second trimester


Onset Date: Unknown   Current Visit: Yes   Status: Acute   





Subjective





- Subjective


Date of service: 07/28/17 (Midiwife note)


Principal diagnosis: IUP @ 24+5; marginal previa, IUGR, absent doppler studies


Patient reports: fetal movement normal, no new complaints, no loss of fluid, no 

vaginal bleeding, no contractions





Objective





- Exam


Breasts: normal


Cardiovascular: Regular rate


Lungs: Clear to auscultation, Normal air movement


Abdomen: Present: normal appearance, soft


Vulva: both: normal


Uterus: Present: normal


FHR: auscultation normal


Uterine Contraction Monitor Mode: External


Uterine Contraction Pattern: Absent


Extremities: normal





- Labs


Labs: 


 Abnormal Labs











  07/27/17





  18:15


 


WBC  11.5 H


 


Mono % (Auto)  8.4 H


 


Mono #  1.0 H


 


Seg Neutrophils %  73.8 H


 


Seg Neutrophils #  8.5 H








 Laboratory Results - last 24 hr











  07/27/17 07/27/17 07/28/17





  18:15 18:15 06:00


 


WBC  11.5 H  


 


RBC  3.87  


 


Hgb  12.5  


 


Hct  36.4  


 


MCV  94  


 


MCH  32  


 


MCHC  34  


 


RDW  13.2  


 


Plt Count  193  


 


Lymph % (Auto)  16.3  


 


Mono % (Auto)  8.4 H  


 


Eos % (Auto)  1.2  


 


Baso % (Auto)  0.3  


 


Lymph #  1.9  


 


Mono #  1.0 H  


 


Eos #  0.1  


 


Baso #  0.0  


 


Seg Neutrophils %  73.8 H  


 


Seg Neutrophils #  8.5 H  


 


Post-Glucola Glucose    TNR


 


Blood Type   A POSITIVE 


 


Antibody Screen   TNR 


 


EDEN Antibody Screen   Negative 














<ROLA ROSADO - Last Filed: 07/28/17 21:01>





Assessment and Plan





- Patient Problems


(1) 24 weeks gestation of pregnancy


Current Visit: Yes   Status: Acute   





(2) IUGR (intrauterine growth restriction) affecting care of mother


Current Visit: Yes   Status: Acute   


Qualifiers: 


   Fetus number: single or unspecified fetus   Trimester: second trimester   

Qualified Code(s): O36.5920 - Maternal care for other known or suspected poor 

fetal growth, second trimester, not applicable or unspecified   





(3) Non-reassuring fetal status


Onset Date: ~07/27/17   Current Visit: Yes   Status: Acute   


Plan to address problem: 


abnormal dopplers, MFM consult pending


FHT's appropriate for GA








(4) Placenta previa antepartum in second trimester


Onset Date: Unknown   Current Visit: Yes   Status: Acute   


Plan to address problem: 


No bleeding


She was informed she will require a c/s for delivery. Discussed possible need 

to hysterectomy if bleeding cannot be controlled at the time of her c/s. 








(5) Elderly multigravida in second trimester


Current Visit: No   Status: Acute   





Objective





- Vital Signs


Vital Signs: 


 Vital Signs - 12hr











  07/28/17 07/28/17 07/28/17





  12:29 14:01 17:34


 


Temperature  98.1 F 98 F


 


Pulse Rate 94 H 68 60


 


Respiratory  16 14





Rate   


 


Blood Pressure 92/55 110/60 110/60


 


O2 Sat by Pulse   





Oximetry   














  07/28/17 07/28/17 07/28/17





  18:00 20:00 20:22


 


Temperature  98.5 F 


 


Pulse Rate 92 H 92 H 88


 


Respiratory  20 





Rate   


 


Blood Pressure 100/55 100/55 


 


O2 Sat by Pulse   98





Oximetry   














  07/28/17 07/28/17 07/28/17





  20:27 20:32 20:37


 


Temperature   


 


Pulse Rate 98 H 90 93 H


 


Respiratory   





Rate   


 


Blood Pressure   


 


O2 Sat by Pulse 98 98 98





Oximetry   














  07/28/17 07/28/17





  20:42 20:47


 


Temperature  


 


Pulse Rate 95 H 112 H


 


Respiratory  





Rate  


 


Blood Pressure  


 


O2 Sat by Pulse 98 98





Oximetry  














- Labs


Labs: 


 Abnormal Labs











  07/27/17 07/28/17





  18:15 07:06


 


WBC  11.5 H 


 


Mono % (Auto)  8.4 H 


 


Mono #  1.0 H 


 


Seg Neutrophils %  73.8 H 


 


Seg Neutrophils #  8.5 H 


 


Post-Glucola Glucose   146 H








 Laboratory Results - last 24 hr











  07/28/17 07/28/17





  06:00 07:06


 


Post-Glucola Glucose  TNR  146 H

## 2017-07-29 VITALS — SYSTOLIC BLOOD PRESSURE: 90 MMHG | DIASTOLIC BLOOD PRESSURE: 56 MMHG

## 2017-07-29 RX ADMIN — SODIUM CHLORIDE, SODIUM LACTATE, POTASSIUM CHLORIDE, AND CALCIUM CHLORIDE SCH MLS/HR: .6; .31; .03; .02 INJECTION, SOLUTION INTRAVENOUS at 02:58

## 2017-07-29 NOTE — PROGRESS NOTE
Assessment and Plan





Assessment





1. 24+6 weeks


2. IUGR


3. Placenta previa w/ bleeding


4. Hx lymphoma


5. AMA





Recommendations/discussion





1. IUGR: images for dopplers reviewed. No absent or reversed end diastolic 

flow. S/D ratios basically normal for gestational age.


2. Previa: no bleeding for the past 6 days.


3. Based on reassuring dopplers and normal LUIS ANTONIO, an no bleeding for almost a week

, can evaluate for outpatient management. We can contact her to f/u early next 

week.





Subjective





- Subjective


Principal diagnosis: IUP @ 24+5; marginal previa, IUGR, absent doppler studies


Patient reports: fetal movement normal, no new complaints, no loss of fluid, no 

vaginal bleeding, no contractions





Objective





- Vital Signs


Vital Signs: 


 Vital Signs - 12hr











  07/28/17 07/28/17 07/28/17





  20:00 20:22 20:27


 


Temperature 98.5 F  


 


Pulse Rate 92 H 88 98 H


 


Respiratory 20  





Rate   


 


Blood Pressure 100/55  


 


O2 Sat by Pulse  98 98





Oximetry   














  07/28/17 07/28/17 07/28/17





  20:32 20:37 20:42


 


Temperature   


 


Pulse Rate 90 93 H 95 H


 


Respiratory   





Rate   


 


Blood Pressure   


 


O2 Sat by Pulse 98 98 98





Oximetry   














  07/28/17 07/28/17 07/28/17





  20:47 21:36 21:45


 


Temperature   


 


Pulse Rate 112 H 95 H 96 H


 


Respiratory   





Rate   


 


Blood Pressure  113/63 


 


O2 Sat by Pulse 98  96





Oximetry   














  07/28/17 07/28/17 07/28/17





  21:50 21:55 22:00


 


Temperature   


 


Pulse Rate 91 H 94 H 96 H


 


Respiratory   





Rate   


 


Blood Pressure   


 


O2 Sat by Pulse 97 97 97





Oximetry   














  07/29/17 07/29/17





  02:58 03:00


 


Temperature  97.9 F


 


Pulse Rate 84 84


 


Respiratory  18





Rate  


 


Blood Pressure 94/52 94/52


 


O2 Sat by Pulse  





Oximetry  














- Exam


Cardiovascular: Regular rate


Lungs: Clear to auscultation


Abdomen: Present: soft.  Absent: tenderness


Uterus: Present: normal


FHR: other (reassuring for gestational age)


Extremities: normal





- Labs


Labs: 


 Abnormal Labs











  07/27/17 07/28/17





  18:15 07:06


 


WBC  11.5 H 


 


Mono % (Auto)  8.4 H 


 


Mono #  1.0 H 


 


Seg Neutrophils %  73.8 H 


 


Seg Neutrophils #  8.5 H 


 


Post-Glucola Glucose   146 H








 Laboratory Results - last 24 hr











  07/28/17 07/28/17





  06:00 07:06


 


Post-Glucola Glucose  TNR  146 H

## 2017-07-29 NOTE — DISCHARGE SUMMARY
Providers





- Providers


Date of Admission: 


07/27/17 16:01





Date of discharge: 07/29/17


Attending physician: 


NATALIE SILVA





 





07/27/17 16:01


Consult to Physician [CONS] Routine 


   Consulting Provider: ABRAM DUPREE


   Reason For Exam: continuity of care


   Place consult to:: DAVE


   Notified:: OFFICE


   Phone number called:: [phone]


   Was contact made?: Yes


   If yes, spoke with:: LISA


   Time called:: 17:47


   Comment::  will call in am











Primary care physician: 


ROLA MENDEZ








Hospitalization


Reason for admission: observation


Discharge diagnosis: other (fetal wellbeing, fetal monitoring and doppler flow 

studies)


Hospital course: 


 observation for fetal wellbeing





Condition at discharge: Good


Disposition: DC-01 TO HOME OR SELFCARE





- Discharge Diagnoses


(1) 24 weeks gestation of pregnancy


Status: Acute   





(2) IUGR (intrauterine growth restriction) affecting care of mother


Status: Acute   


Qualifiers: 


   Fetus number: single or unspecified fetus   Trimester: second trimester   

Qualified Code(s): O36.5920 - Maternal care for other known or suspected poor 

fetal growth, second trimester, not applicable or unspecified   





(3) Placenta previa antepartum in second trimester


Status: Acute   





Plan





- Provider Discharge Summary


Activity: routine


Diet: routine


Instructions: routine


Additional instructions: 


[]  Smoking cessation referral if applicable(refer to patient education folder 

for contact #)


[]  Refer to [facility] Booklet








Call your doctor immediately for:


* Fever > 100.5


* Heavy vaginal bleeding ( >1 pad per hour)


* Severe persistent headache


* Shortness of breath


* Reddened, hot, painful area to leg or breast


* Drainage or odor from incision.





* Keep incision clean and dry at all times and follow doctor's instructions 

regarding bathing/showering











- Follow up plan


Follow up: 


RLOA MENDEZ MD [Primary Care Provider] - 07/31/17 10:15 am (Please keep 

your appointment with us 7/31/17. [facility] will call you to schedule your next 

appointment early next week. Call [phone] with any questions or concerns. )

## 2017-10-11 ENCOUNTER — HOSPITAL ENCOUNTER (OUTPATIENT)
Dept: HOSPITAL 5 - TRG | Age: 37
Discharge: HOME | End: 2017-10-11
Attending: OBSTETRICS & GYNECOLOGY
Payer: COMMERCIAL

## 2017-10-11 DIAGNOSIS — O09.523: Primary | ICD-10-CM

## 2017-10-11 DIAGNOSIS — Z3A.35: ICD-10-CM

## 2017-10-11 DIAGNOSIS — O36.0130: ICD-10-CM

## 2017-10-11 PROCEDURE — 96372 THER/PROPH/DIAG INJ SC/IM: CPT

## 2017-10-11 RX ADMIN — BETAMETHASONE SODIUM PHOSPHATE AND BETAMETHASONE ACETATE ONE MG: 3; 3 INJECTION, SUSPENSION INTRA-ARTICULAR; INTRALESIONAL; INTRAMUSCULAR at 12:21

## 2017-10-11 NOTE — HISTORY AND PHYSICAL REPORT
History of Present Illness


Date of examination: 10/11/17


Date of admission: 


10/16/2017





Chief complaint: 


36 yo  (prior vag del) at 36 wk gestation with known partial previa and 

probable vasa previa seen on u/s today. Fetal head is floating on exam.  

Received  steroids at 24 wk and again on 10/11 and 10/12. Patient has 

continued to have small amount of vaginal bleeding for the last couple of 

months.  Patient understands that there is a higher risk of hemorrhage and 

hysterctomy than usually seen with  section.


Past medical history is significant for abdominal and thoracic radiation and 

systemic chemotherapy [id number] for a non-Hodgkins lymphoma, she says Stage 1 

and Stage 3.  She had two years of maintenance chemotherapy [id number].  She 

also had a Right subclavian port placed and Left axillary lymphadenectomy 

during that course.  She has been recurrence-free since that time.  A PET scan 

is planned after the post-partum period


History of present illness: 


Remainder of H&P from [facility] and confirmed today.


OB Intake 


Ethnicity: 


Anabaptist: Amish


Occupation: 


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


Father of baby: Heron Lund


FOB contact #: [phone]





Vital Signs 


Height: 61 in.   


 


BP: 112/ 62 mm Hg


Ur. Protein: negative


Ur. Glucose: negative


Chief Complaint/Current Status: Pt Presenting for Missed Period 11w 

2d...cchandler





Menstrual History 


Regularity: regular


Menses every: 28 days


Duration: 5


LMP: 2017


LMP reliability: definite


LMP character: normal


Pregnancy test type: urine test   Date: 2017


BC at conception: BCP


Planned pregnancy? no





EDC Calculations 


LMP: 10/10/2017


Initial exam (date): 2017


Exam: 2018





EDC Confirmation:  2017


Gestational Age:  11 1/7 weeks





Past Pregnancy History 


   :      2


   Term Births:      1


   Living Children:   1





Pregnancy # 1


   Delivery date:     3/2006


   Weeks Gestation:   term


   Delivery type:     


   Anesthesia type:     epidural


   Delivery location:     [facility]


   Infant Sex:      Female


   Birth weight:      5-14








Past Medical History:


   Reviewed history from 2017 and no changes required:


      Non-hodgekins lymphoma Stage 3 in 3/2010 had chemo and radiation


      last radiation/chemo was  then had 2 yrs maintenance chemo


      PTSD


      Anxiety/OCD





Past Surgical History:


   Reviewed history from 2017 and no changes required:


      


      lymphectomy


      port placement in and out


      Breast Surgery: 


      bone marrow aspiration


      Denies any prior history of complications from anesthesia. 


      Breast Biopsy: 2016 benign





Past Medical History 


Abnormal PAP: negative


HUEY Exposure: negative


Infertility: negative


Uterine Anomaly: negative


Uterine Surgery (not C/S): negative


Other Gynecologic Problems: negative





Social Hx: Patient is 





Smoking History:


Patient has never smoked.








Infection History 


Hx of STD: none


HIV Risk Eval: low risk


Hepatitis B Risk Eval: low risk


Personal hx. of genital herpes: no


Partner hx. of genital herpes: no


Rash, Viral, or Febrile illness since last LMP? no


Varicella/Chicken Pox Status: Previous Disease


TB Risk: no





Genetic History 


ADVANCED MATERNAL AGE


Congenital Heart Defect:


    Mom: no  Dad: no


Canavan Disease:


    Mom: no  Dad: no


Thalassemia


    Mom: no  Dad: no


Neural Tube Defect


    Mom: no  Dad: no


Down's Syndrome


    Mom: no  Dad: no


Cheko-Sachs


    Mom: no  Dad: no


Sickle Cell Disease/Trait


    Mom: no  Dad: no


Hemophilia


    Mom: no  Dad: no


Muscular Dystrophy


    Mom: no  Dad: no


Cystic Fibrosis


    Mom: no  Dad: no


Ness Chorea


    Mom: no  Dad: no


Mental Retardation


    Mom: no  Dad: no


Fragile X


    Mom: no  Dad: no


Other Genetic/Chromosomal Disorder


    Mom: no  Dad: no


Child w/other birth defect


    Mom: no  Dad: no





Enviromental Exposures 


Xray Exposure: no


Medication, drug, or alcohol use since LMP: no


Chemical/Other Exposure: no


Exposure to Cat Liter: no


Hx of Parvovirus (Fifth Disease): no


Occupational Exposure to Children: none


Active Medications (reviewed today):


ZOFRAN ODT 8 MG TBDP (ONDANSETRON) 1 po q12hrs prn


TRNESSA () 





Current Allergies (reviewed today):


No known allergies


Laboratory Results 





Routine Urinalysis 


Leukocytes: negative


Nitrite: negative


Urobilinogen: negative


Protein: negative


Blood: negative


Ketone: negative


Bilirubin: negative


Glucose: negative


Urine HCG: positive





Review of Systems 





General


     Denies fever, chills, sweats, anorexia, fatigue, weakness, malaise, weight 

loss and sleep disorder.





Prenatal


     Denies nausea, vomiting, headache, swelling of legs, abdominal pain, 

vaginal discharge, vaginal bleeding and contractions.








     Denies vaginal discharge, incontinence, dysuria, hematuria, urinary 

frequency, amenorrhea, menorrhagia, abnormal vaginal bleeding, pelvic pain, 

genital sores, decreased libido, painful periods, painful sex, urinary urgency, 

hot flashes, vaginal dryness, vaginal itching and vaginal odor.





CV


     Denies chest pains, palpitations, syncope, dyspnea on exertion, orthopnea, 

PND and peripheral edema.





Resp


     Denies cough, dyspnea at rest, excessive sputum, hemoptysis, wheezing and 

pleurisy.





GI


     Denies nausea, vomiting, diarrhea, constipation, change in bowel habits, 

abdominal pain, melena, hematochezia, jaundice, gas/bloating, indigestion/

heartburn, dysphagia and odynophagia.





Endo


     Denies cold intolerance, heat intolerance, polydipsia, polyphagia, 

polyuria and unusual weight change.





Breast


     Denies left breast lump, right breast lump, nipple discharge, bloody 

discharge from nipple, breast pain, abnormal mammogram and breast enlargement.





MS


     Denies back pain, joint pain, joint swelling, muscle cramps, muscle 

weakness, stiffness, arthritis, sciatica, restless legs, leg pain at night and 

leg pain with exertion.





Derm


     Denies rash, itching, dryness and suspicious lesions.





Neuro


     Denies paralysis, paresthesias, headache, seizures, tremors, vertigo, 

transient blindness, frequent falls, frequent headaches and difficulty walking.





Psych


     Denies depression, anxiety, irritability and mood swings.





Eyes


     Denies blurring, diplopia, irritation, discharge, vision loss, eye pain 

and photophobia.





ENT


     Denies earache, ear discharge, tinnitus, decreased hearing, nasal 

congestion, nosebleeds, sore throat and hoarseness.





Allergy


     Denies urticaria, allergic rash, hay fever and recurrent infections.





Heme


     Denies abnormal bruising, bleeding and enlarged lymph nodes.





PHYSICAL EXAM 


HEENT: PERRLA, normal conjunctiva, external nose and nasal mucosa normal, 

oropharynx clear


Neck/Thyroid: supple, thyroid normal


Skin no significant abnormal lesions or rashes


Chest: respiratory effort normal, clear to auscultation


Breasts: normal without skin changes or masses


CV: regular, normal S1-S2, no murmur, no rub, no gallop


Abdomen: normal bowel sounds, soft, nontender, no HSM


Musculoskeletal: grossly normal ROM in joints, no joint tenderness or muscle 

weakness


Neuro: grossly normal DTRs, sensation, strength, cranial nerves


Extremities: no clubbing, cyanosis, or edema





GYN Exams 


Vulva/Vagina: No lesions, normal BUS, normal rugae


Cervix: No lesions; no cervical motion tenderness


Uterus: normal size and position, midline, mobile


Adnexae: no masses or tenderness


Rectovaginal: no masses or tenderness





Flowsheet View for Follow-up Visit


   Estimated weeks of


      gestation:      11 17


   Blood pressure:   112 / 62


   Urine protein:       negative


   Urine glucose:    negative


   Urine nitrite:      negative





Current OB Labs 


Urine HCG: positive  (2017)


Infant's Physician: [facility]











Past History





- Obstetrical History


: 2





Medications and Allergies


 Allergies











Allergy/AdvReac Type Severity Reaction Status Date / Time


 


No Known Drug Allergies Allergy  Unknown Verified 17 21:22











 Home Medications











 Medication  Instructions  Recorded  Confirmed  Last Taken  Type


 


busPIRone [Buspar] 15 mg PO BID 17 14:30 History











Active Meds: 


Active Medications





Citric Acid/Sodium Citrate (Bicitra)  30 ml PO ONCE ONE


   Stop: 10/11/17 14:15


Famotidine (Pepcid)  20 mg IV ONCE ONE


   Stop: 10/11/17 14:15


Cefazolin Sodium (Ancef/Sterile Water 2 Gm/20 Ml)  2 gm in 20 mls @ 80 mls/hr 

IV PREOP NR


   PRN Reason: Protocol


Lactated Ringer's (Lactated Ringers)  1,000 mls @ 2,250 mls/hr IV PREOP MARCO


   Stop: 10/12/17 15:27


Oxytocin/Sodium Chloride (Pitocin/Ns 20 Unit/1000ml Drip)  20 units in 1,000 

mls @ 0 mls/hr IV TITR MARCO


   PRN Reason: As Directed


Metoclopramide HCl (Reglan)  10 mg IV ONCE ONE


   Stop: 10/11/17 14:15











Results


All other labs normal.








Assessment and Plan





- Patient Problems


(1) Placenta previa antepartum in third trimester


Status: Acute   


Plan to address problem: 


 delivery at 36 weeks,  steroids have been given.








(2) 36 to 37 weeks gestation of pregnancy


Status: Acute   





(3) IUGR (intrauterine growth restriction) affecting care of mother


Status: Acute   


Qualifiers: 


   Fetus number: single or unspecified fetus   Trimester: second trimester   

Qualified Code(s): O36.5920 - Maternal care for other known or suspected poor 

fetal growth, second trimester, not applicable or unspecified   





(4) History of non-Hodgkin's lymphoma


Status: Chronic   





(5) Advanced maternal age (AMA) in pregnancy


Status: Chronic   





(6) Post traumatic stress disorder (PTSD)


Status: Acute

## 2017-10-12 ENCOUNTER — HOSPITAL ENCOUNTER (OUTPATIENT)
Dept: HOSPITAL 5 - TRG | Age: 37
Discharge: HOME | End: 2017-10-12
Attending: OBSTETRICS & GYNECOLOGY
Payer: COMMERCIAL

## 2017-10-12 DIAGNOSIS — Z3A.35: ICD-10-CM

## 2017-10-12 DIAGNOSIS — O47.03: ICD-10-CM

## 2017-10-12 DIAGNOSIS — O09.523: Primary | ICD-10-CM

## 2017-10-12 PROCEDURE — 96372 THER/PROPH/DIAG INJ SC/IM: CPT

## 2017-10-16 ENCOUNTER — HOSPITAL ENCOUNTER (INPATIENT)
Dept: HOSPITAL 5 - APU | Age: 37
LOS: 3 days | Discharge: HOME | End: 2017-10-19
Attending: OBSTETRICS & GYNECOLOGY | Admitting: OBSTETRICS & GYNECOLOGY
Payer: COMMERCIAL

## 2017-10-16 DIAGNOSIS — O36.5930: ICD-10-CM

## 2017-10-16 DIAGNOSIS — D64.9: ICD-10-CM

## 2017-10-16 DIAGNOSIS — Z3A.36: ICD-10-CM

## 2017-10-16 DIAGNOSIS — F43.10: ICD-10-CM

## 2017-10-16 DIAGNOSIS — O44.03: Primary | ICD-10-CM

## 2017-10-16 LAB
ALBUMIN SERPL-MCNC: 3.4 G/DL (ref 3.9–5)
ALBUMIN/GLOB SERPL: 1.6 %
ALP SERPL-CCNC: 161 UNITS/L (ref 35–129)
ALT SERPL-CCNC: 16 UNITS/L (ref 7–56)
ANION GAP SERPL CALC-SCNC: 18 MMOL/L
BASOPHILS NFR BLD AUTO: 0.3 % (ref 0–1.8)
BILIRUB SERPL-MCNC: 0.3 MG/DL (ref 0.1–1.2)
BUN SERPL-MCNC: 11 MG/DL (ref 7–17)
BUN/CREAT SERPL: 22 %
CALCIUM SERPL-MCNC: 8.6 MG/DL (ref 8.4–10.2)
CHLORIDE SERPL-SCNC: 103.8 MMOL/L (ref 98–107)
CO2 SERPL-SCNC: 20 MMOL/L (ref 22–30)
EOSINOPHIL NFR BLD AUTO: 1.2 % (ref 0–4.3)
GLUCOSE SERPL-MCNC: 85 MG/DL (ref 65–100)
HCT VFR BLD CALC: 32.3 % (ref 30.3–42.9)
HCT VFR BLD CALC: 35.8 % (ref 30.3–42.9)
HGB BLD-MCNC: 10.9 GM/DL (ref 10.1–14.3)
HGB BLD-MCNC: 12.1 GM/DL (ref 10.1–14.3)
MCH RBC QN AUTO: 32 PG (ref 28–32)
MCHC RBC AUTO-ENTMCNC: 34 % (ref 30–34)
MCV RBC AUTO: 93 FL (ref 79–97)
PLATELET # BLD: 169 K/MM3 (ref 140–440)
POTASSIUM SERPL-SCNC: 4.1 MMOL/L (ref 3.6–5)
PROT SERPL-MCNC: 5.5 G/DL (ref 6.3–8.2)
RBC # BLD AUTO: 3.84 M/MM3 (ref 3.65–5.03)
SODIUM SERPL-SCNC: 138 MMOL/L (ref 137–145)
WBC # BLD AUTO: 9.6 K/MM3 (ref 4.5–11)

## 2017-10-16 PROCEDURE — G0463 HOSPITAL OUTPT CLINIC VISIT: HCPCS

## 2017-10-16 PROCEDURE — 90715 TDAP VACCINE 7 YRS/> IM: CPT

## 2017-10-16 PROCEDURE — 85025 COMPLETE CBC W/AUTO DIFF WBC: CPT

## 2017-10-16 PROCEDURE — 90471 IMMUNIZATION ADMIN: CPT

## 2017-10-16 PROCEDURE — 86850 RBC ANTIBODY SCREEN: CPT

## 2017-10-16 PROCEDURE — 99211 OFF/OP EST MAY X REQ PHY/QHP: CPT

## 2017-10-16 PROCEDURE — A6250 SKIN SEAL PROTECT MOISTURIZR: HCPCS

## 2017-10-16 PROCEDURE — 86901 BLOOD TYPING SEROLOGIC RH(D): CPT

## 2017-10-16 PROCEDURE — 80053 COMPREHEN METABOLIC PANEL: CPT

## 2017-10-16 PROCEDURE — 85018 HEMOGLOBIN: CPT

## 2017-10-16 PROCEDURE — 86900 BLOOD TYPING SEROLOGIC ABO: CPT

## 2017-10-16 PROCEDURE — 88307 TISSUE EXAM BY PATHOLOGIST: CPT

## 2017-10-16 PROCEDURE — 85014 HEMATOCRIT: CPT

## 2017-10-16 PROCEDURE — 36415 COLL VENOUS BLD VENIPUNCTURE: CPT

## 2017-10-16 RX ADMIN — CEFAZOLIN SCH MLS/HR: 330 INJECTION, POWDER, FOR SOLUTION INTRAMUSCULAR; INTRAVENOUS at 15:07

## 2017-10-16 RX ADMIN — SODIUM CHLORIDE, SODIUM LACTATE, POTASSIUM CHLORIDE, AND CALCIUM CHLORIDE SCH MLS/HR: .6; .31; .03; .02 INJECTION, SOLUTION INTRAVENOUS at 07:09

## 2017-10-16 RX ADMIN — KETOROLAC TROMETHAMINE PRN MG: 30 INJECTION, SOLUTION INTRAMUSCULAR at 13:05

## 2017-10-16 RX ADMIN — SODIUM CHLORIDE, SODIUM LACTATE, POTASSIUM CHLORIDE, AND CALCIUM CHLORIDE SCH MLS/HR: .6; .31; .03; .02 INJECTION, SOLUTION INTRAVENOUS at 10:36

## 2017-10-16 RX ADMIN — CEFAZOLIN SCH MLS/HR: 330 INJECTION, POWDER, FOR SOLUTION INTRAMUSCULAR; INTRAVENOUS at 22:49

## 2017-10-16 RX ADMIN — KETOROLAC TROMETHAMINE PRN MG: 30 INJECTION, SOLUTION INTRAMUSCULAR at 18:14

## 2017-10-16 RX ADMIN — OXYCODONE AND ACETAMINOPHEN PRN TAB: 5; 325 TABLET ORAL at 22:43

## 2017-10-16 NOTE — ANESTHESIA CONSULTATION
Anesthesia Consult and Med Hx


Date of service: 10/16/17





- Airway


Anesthetic Teeth Evaluation: Good


ROM Head & Neck: Adequate


Mental/Hyoid Distance: Adequate


Mallampati Class: Class I


Intubation Access Assessment: Good





- Pulmonary Exam


CTA: Yes





- Cardiac Exam


Cardiac Exam: RRR





- Pre-Operative Health Status


ASA Pre-Surgery Classification: ASA2


Proposed Anesthetic Plan: General





- Pulmonary


Hx Asthma: No


COPD: No


Hx Pneumonia: No





- Cardiovascular System


Hx Hypertension: No





- Central Nervous System


Hx Seizures: No


Hx Psychiatric Problems: No





- Endocrine


Hx Renal Disease: No


Hx End Stage Renal Disease: No


Hx Hypothyroidism: No


Hx Hyperthyroidism: No





- Hematic


Hx Anemia: No


Hx Sickle Cell Disease: No





- Other Systems


Hx Alcohol Use: No





- Additional Comments


Anesthesia Medical History Comments: Hx non-Hodgekin's lymphoma.  [facility] 

previously.

## 2017-10-16 NOTE — OPERATIVE REPORT
Operative Report


Operative Report: 


Date of Procedure: 10/16/2017





Procedure name(s): Primary transverse low segment  section





Pre-operative diagnosis: Intrauterine pregnancy at 36 weeks gestation with 

known placenta previa and known intrauterine growth retardation, continued 

vaginal spotting and delivery  section at 36 weeks recommended by [facility] 

and advanced maternal age





Post-operative diagnosis: Same plus floating unengaged vertex at 36 weeks and 

placenta previa and vasa previa with inferior lip of placenta overlying 

cervical os and eccentric cord insertion in roughly the same area





Surgeon: Tracie Polanco M.D.





Assistant: Manuela Barger CNM





Anesthesia: Spinal





EBL: 700 mL





Infant: 4 lbs. 0 oz., 1828 g female with Apgars of 9 and 9





Time of Birth: 0808





Findings


Normal tubes, uterus and ovaries, floating unengaged vertex at 36 weeks plus 

placenta previa and vasa previa with inferior lip of placenta overlying 

cervical os and eccentric cord insertion in roughly the same area





Procedure


The patient was taken to the operating room and after adequate anesthesia was 

obtained she was placed in the supine position in left lateral tilt. Sequential 

compression devices were in place on both lower extremities and a Russ 

catheter was placed in a sterile fashion. The abdomen was then prepped and 

draped in the usual fashion.  Surgical time-out was done with the entire OR 

team attentive.





A Pfannenstiel incision was made with a scalpel and sharp dissection was 

carried down through all layers of the abdomen in the usual fashion.  The 

abdomen was entered bluntly and the lower uterine segment was identified.  The 

presenting part was palpated and was high and a bladder flap was created with 

the Metzenbaum scissors. The scalpel was used to incise the uterus in a 

transverse fashion and this incision was extended bluntly with finger traction 

and the membranes were ruptured. My hand was inserted into the uterus. The 

vertex was grasped, rotated to an OA presentation and delivered with a 

combination of traction and fundal pressure.  The cord was clamped and cut and 

a viable infant was suctioned and handed off to the attending NICU staff and 

was a 4 lbs. 0 oz., 1828 g female with Apgars of 9 and 9





The placenta was removed manually, the interior of the uterus was cleansed with 

moist lap packs and the uterus was exteriorized.  The uterine incision was 

closed with a triple imbricating layer of 0 Vicryl suture in a running fashion.

  Hemostasis was adequate and the uterus was returned to the abdomen.  Uterus 

was well contracted. The abdomen was then closed in layers: the rectus muscles 

were closed with several figure-of-eight sutures of 0 Vicryl, the fascia was 

closed with running sutures of 0 Vicryl starting at both side corners in turn 

and tied together in the midline.  The subcutaneous tissue was irrigated and 

then closed with several interrupted sutures of 2-0 plain and the skin was 

closed with a running subcuticular suture of 4-0 Vicryl.





Sponge and Lap count correct X 3, estimated blood loss was 700 mL and the Russ 

was draining clear urine. The patient tolerated the procedure well and was 

discharged to PACU in good condition.

## 2017-10-17 RX ADMIN — IBUPROFEN PRN MG: 800 TABLET, FILM COATED ORAL at 10:20

## 2017-10-17 RX ADMIN — OXYCODONE AND ACETAMINOPHEN PRN TAB: 5; 325 TABLET ORAL at 21:03

## 2017-10-17 RX ADMIN — IBUPROFEN PRN MG: 800 TABLET, FILM COATED ORAL at 02:39

## 2017-10-17 RX ADMIN — IBUPROFEN PRN MG: 800 TABLET, FILM COATED ORAL at 18:01

## 2017-10-17 RX ADMIN — OXYCODONE AND ACETAMINOPHEN PRN TAB: 5; 325 TABLET ORAL at 12:17

## 2017-10-17 RX ADMIN — OXYCODONE AND ACETAMINOPHEN PRN TAB: 5; 325 TABLET ORAL at 04:07

## 2017-10-17 NOTE — PROGRESS NOTE
Subjective


Date of service: 10/17/17


Interval history: 





1st day after 


Patient is in the bed, comfortable. Pain is well controlled with pain meds. 

Ambulated well. No residual neurological deficit. Pruritus is not significant. 

No anesthesia complications





Objective





- Constitutional


Vitals: 


 Vital Signs - 12hr











  10/16/17 10/17/17 10/17/17





  22:43 01:05 02:39


 


Temperature  98.6 F 


 


Pulse Rate  89 


 


Respiratory 20 18 18





Rate   


 


Blood Pressure  92/52 





[Right]   


 


O2 Sat by Pulse   





Oximetry   














  10/17/17 10/17/17 10/17/17





  04:07 05:35 08:00


 


Temperature  98.5 F 97.9 F


 


Pulse Rate  93 H 81


 


Respiratory 20 18 18





Rate   


 


Blood Pressure  100/50 85/51





[Right]   


 


O2 Sat by Pulse   97





Oximetry   














- Labs


CBC & Chem 7: 


 10/16/17 21:48





 10/16/17 06:15

## 2017-10-18 RX ADMIN — IBUPROFEN PRN MG: 800 TABLET, FILM COATED ORAL at 13:27

## 2017-10-18 RX ADMIN — MAGNESIUM HYDROXIDE PRN ML: 400 SUSPENSION ORAL at 01:54

## 2017-10-18 RX ADMIN — IBUPROFEN PRN MG: 800 TABLET, FILM COATED ORAL at 05:44

## 2017-10-18 RX ADMIN — OXYCODONE AND ACETAMINOPHEN PRN TAB: 5; 325 TABLET ORAL at 22:31

## 2017-10-18 RX ADMIN — OXYCODONE AND ACETAMINOPHEN PRN TAB: 5; 325 TABLET ORAL at 13:26

## 2017-10-18 RX ADMIN — OXYCODONE AND ACETAMINOPHEN PRN TAB: 5; 325 TABLET ORAL at 04:41

## 2017-10-18 NOTE — PROGRESS NOTE
Assessment and Plan


 patient doing well, no complaints. VSSAF, H&H stable w/o s/s anemia, lochia 

scant, incision D&I. patient ambulating to NICU to visit infant. encouraged 

pumping q3h and good po hydration. plan for d/c home tomorrow, continue postop 

pathway.








- Patient Problems


(1)  delivery delivered


Current Visit: Yes   Status: Acute   





Subjective





- Subjective


Date of service: 10/18/17


Principal diagnosis: postpartum day #2 s/p c/s


Patient reports: appetite normal, voiding normally, pain well controlled, flatus

, ambulating normally, no dizzy ambulation, no nauseated


[place]: in NICU (patient pumping, states infant doing well in NICU)





Objective





- Vital Signs


Latest vital signs: 


 Vital Signs











  Temp Pulse Resp BP


 


 10/18/17 05:44    18 


 


 10/18/17 04:41    18 


 


 10/18/17 00:20  98.6 F  71  18  101/70


 


 10/17/17 16:00  98 F  84  18  91/55


 


 10/17/17 12:00  98.3 F  82  18  90/53








 Intake and Output











 10/17/17 10/18/17 10/18/17





 23:59 07:59 15:59


 


Intake Total 480 300 


 


Balance 480 300 


 


Intake:   


 


  Oral 480  


 


  Intake, Free Water  300 


 


Other:   


 


  Total, Intake Amount 480  


 


  # Voids   


 


    Void 3  














- Exam


Breasts: Present: normal


Cardiovascular: Present: Regular rate


Lungs: Present: Clear to auscultation, Normal air movement


Abdomen: Present: normal appearance, soft, normal bowel sounds


Vulva: both: normal


Uterus: Present: normal, firm, fundal height at umbilicus


Extremities: Present: normal


Deep Tendon Reflex Grade: Normal +2


Incision: Present: normal, dry, intact

## 2017-10-18 NOTE — PROGRESS NOTE
Assessment and Plan


Pt OOB to toilet No c/o voiced VSS FF below umb Lochia small Dressing D&I H&H 10

/32 drop r/t blood loss from surgery Pt is asymptomatic. Doing well s/p 

 section P: continue pathway Advance diet and activity as tolerated. 








Subjective





- Subjective


Date of service: 10/17/17 (delayed entry Pt seen @ 1200)


Patient reports: appetite normal, voiding normally, pain well controlled, 

ambulating normally


[place]: doing well





Objective





- Vital Signs


Latest vital signs: 


 Vital Signs











  Temp Pulse Resp BP Pulse Ox


 


 10/18/17 00:20  98.6 F  71  18  101/70 


 


 10/17/17 16:00  98 F  84  18  91/55 


 


 10/17/17 12:00  98.3 F  82  18  90/53 


 


 10/17/17 08:00  97.9 F  81  18  85/51  97


 


 10/17/17 05:35  98.5 F  93 H  18  100/50 


 


 10/17/17 04:07    20  








 Intake and Output











 10/17/17 10/17/17 10/18/17





 14:59 22:59 06:59


 


Intake Total 240 480 300


 


Balance 240 480 300


 


Intake:   


 


  Oral 240 480 


 


  Intake, Free Water   300


 


Other:   


 


  Total, Intake Amount 240 480 


 


  # Voids   


 


    Void  3 














- Exam


Breasts: Present: normal


Cardiovascular: Present: Regular rate


Lungs: Present: Normal air movement


Abdomen: Present: normal appearance, soft


Uterus: Present: normal, fundal height below umbilicus


Extremities: Present: normal


Incision: Present: normal, dry, intact, dressed (to be removed)

## 2017-10-19 VITALS — DIASTOLIC BLOOD PRESSURE: 57 MMHG | SYSTOLIC BLOOD PRESSURE: 98 MMHG

## 2017-10-19 RX ADMIN — IBUPROFEN PRN MG: 800 TABLET, FILM COATED ORAL at 05:43

## 2017-10-19 RX ADMIN — IBUPROFEN PRN MG: 800 TABLET, FILM COATED ORAL at 12:30

## 2017-10-19 RX ADMIN — MAGNESIUM HYDROXIDE PRN ML: 400 SUSPENSION ORAL at 12:34

## 2017-10-19 NOTE — DISCHARGE SUMMARY
Providers





- Providers


Date of Admission: 


10/16/17 05:38





Date of discharge: 10/19/17 (pt agrees to d/c)


Attending physician: 


ILAN HAHN





Primary care physician: 


ROLA MENDEZ








Hospitalization


Reason for admission:  section


Delivery: 


Procedure: primary low transverse


Episiotomy: none


Laceration: none


Incision: normal, dry, intact


Other postpartum procedures: none


Postpartum complications: none


Discharge diagnosis: IUP at term delivered


 baby: female


Hospital course: 





Uncomplicated  section due to placenta previa





Pt OOB ambulating in room. No c/o voiced VSS FF below umb Lochia scant Incision 

D&I Pt w/o s/sx of anemia Doing well s/p  section P: d/c today with 

instructions RTO 1 week for postop visit. RX given @ d/c 


Condition at discharge: Good


Disposition: DC-01 TO HOME OR SELFCARE





- Discharge Diagnoses


(1)  delivery delivered


Status: Acute   Comment: rto 1 week postop care   





Plan





- Discharge Medications


Prescriptions: 


Ibuprofen [Motrin 800 MG tab] 800 mg PO Q8HR PRN #30 tablet


 PRN Reason: Pain


Ibuprofen [Motrin 800 MG tab] 800 mg PO Q8HR PRN #30 tablet


 PRN Reason: Pain


oxyCODONE /ACETAMINOPHEN [Percocet 5/325 mg] 1 - 2 tab PO Q4HR PRN #30 tab


 PRN Reason: Pain


oxyCODONE /ACETAMINOPHEN [Percocet 5/325 mg] 1 - 2 tab PO Q4HR PRN #30 tab


 PRN Reason: Pain





- Provider Discharge Summary


Activity: routine, no sex for 6 weeks, no heavy lifting 4 weeks, no strenuous 

exercise


Diet: routine


Instructions: routine


Additional instructions: 


[]  Smoking cessation referral if applicable(refer to patient education folder 

for contact #)


[]  Refer to [facility] Booklet








Call your doctor immediately for:


* Fever > 100.5


* Heavy vaginal bleeding ( >1 pad per hour)


* Severe persistent headache


* Shortness of breath


* Reddened, hot, painful area to leg or breast


* Drainage or odor from incision.





* Keep incision clean and dry at all times and follow doctor's instructions 

regarding bathing/showering











- Follow up plan


Follow up: 


ROLA MENDEZ MD [Primary Care Provider] - 7 Days


(Congratulations!


Please call [phone] to schedule your postoperative visit in 1 week. 


Take medications as prescribed.


Call with concerns.)

## 2021-07-19 ENCOUNTER — RX ONLY (OUTPATIENT)
Age: 41
Setting detail: RX ONLY
End: 2021-07-19

## 2021-07-19 ENCOUNTER — APPOINTMENT (RX ONLY)
Dept: URBAN - METROPOLITAN AREA CLINIC 45 | Facility: CLINIC | Age: 41
Setting detail: DERMATOLOGY
End: 2021-07-19

## 2021-07-19 ENCOUNTER — APPOINTMENT (RX ONLY)
Dept: URBAN - METROPOLITAN AREA CLINIC 44 | Facility: CLINIC | Age: 41
Setting detail: DERMATOLOGY
End: 2021-07-19

## 2021-07-19 DIAGNOSIS — L0390 CELLULITIS AND ABSCESS OF UNSPECIFIED SITES: ICD-10-CM

## 2021-07-19 DIAGNOSIS — L0391 CELLULITIS AND ABSCESS OF UNSPECIFIED SITES: ICD-10-CM

## 2021-07-19 PROBLEM — L02.411 CUTANEOUS ABSCESS OF RIGHT AXILLA: Status: ACTIVE | Noted: 2021-07-19

## 2021-07-19 PROCEDURE — ? ORDER TESTS

## 2021-07-19 PROCEDURE — ? INCISION AND DRAINAGE

## 2021-07-19 PROCEDURE — ? COUNSELING

## 2021-07-19 PROCEDURE — ? ADDITIONAL NOTES

## 2021-07-19 PROCEDURE — 10060 I&D ABSCESS SIMPLE/SINGLE: CPT

## 2021-07-19 RX ORDER — SULFAMETHOXAZOLE AND TRIMETHOPRIM 800; 160 MG/1; MG/1
1 TABLET ORAL BID
Qty: 20 | Refills: 0 | Status: ERX | COMMUNITY
Start: 2021-07-19

## 2021-07-19 ASSESSMENT — LOCATION ZONE DERM
LOCATION ZONE: AXILLAE
LOCATION ZONE: AXILLAE

## 2021-07-19 ASSESSMENT — LOCATION DETAILED DESCRIPTION DERM
LOCATION DETAILED: RIGHT POSTERIOR AXILLA
LOCATION DETAILED: RIGHT POSTERIOR AXILLA

## 2021-07-19 ASSESSMENT — LOCATION SIMPLE DESCRIPTION DERM
LOCATION SIMPLE: RIGHT POSTERIOR AXILLA
LOCATION SIMPLE: RIGHT POSTERIOR AXILLA

## 2021-07-19 NOTE — PROCEDURE: ORDER TESTS
No Vaccines Administered.
Bill For Surgical Tray: no
Billing Type: Third-Party Bill
Expected Date Of Service: 07/19/2021

## 2021-07-19 NOTE — PROCEDURE: ADDITIONAL NOTES
Additional Notes: R axilla abscess s/p I&D Jan 2021 and July 6, 2021.  Now recurrent and TTP.  Mild drainage recently.  She recently took oral DOXY.  It is deep and oblong today. 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  She had a (+) LN in the R axilla. \\n\\nI&D attempted but minimal bloody discharge. It was probed to a depth of 1 cm with forceps in all directions but no loculations noted.\\n\\nPrescribed Bactrim BID for 10 days. Reviewed AEs incl rash, HA, GI upset, NV, and hypersensitivity.\\n\\nBacterial culture performed.\\n\\nRTC 6-8 weeks.  If not fully resolved then rec US of R axilla to r/o lymphoma.  She may ultimately need to see a general surgery for definitive treatment due to size/depth.
Detail Level: Detailed
Render Risk Assessment In Note?: no

## 2021-07-19 NOTE — PROCEDURE: ORDER TESTS
Billing Type: Third-Party Bill
Performing Laboratory: [id number]
Bill For Surgical Tray: no
Expected Date Of Service: 07/19/2021
Lab Facility: [id number]

## 2021-07-19 NOTE — PROCEDURE: ADDITIONAL NOTES
Detail Level: Detailed
Additional Notes: R axilla abscess s/p I&D Jan 2021 and July 6, 2021.  Now recurrent and TTP.  Mild drainage recently.  She recently took oral DOXY.  It is deep and oblong today. 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  She had a (+) LN in the R axilla. \\n\\nI&D attempted but minimal bloody discharge. It was probed to a depth of 1 cm with forceps in all directions but no loculations noted.\\n\\nPrescribed Bactrim BID for 10 days. Reviewed AEs incl rash, HA, GI upset, NV, and hypersensitivity.\\n\\nBacterial culture performed.\\n\\nRTC 6-8 weeks.  If not fully resolved then rec US of R axilla to r/o lymphoma.  She may ultimately need to see a general surgery for definitive treatment due to size/depth.
Render Risk Assessment In Note?: no

## 2021-07-19 NOTE — HPI: BODY LOCATION - ARMPITS
How Severe Is Your Condition?: mild
Additional History: In January, The area first started as a tissue growth found in a sonogram. Patient has history of lymphoma. She was given antibiotics which gave minimal relief. In June, patient states was traveling and the abscess filled back up. After her trip, she went to the ER which had the abscess drained and given another round of antibiotics. In the past 10 days, the abscess filled again and has been oozing since Saturday.

## 2021-07-19 NOTE — PROCEDURE: INCISION AND DRAINAGE
Detail Level: Detailed
Lesion Type: Abscess
Method: 11 blade
Curette: No
Anesthesia Type: 1% lidocaine without epinephrine
Anesthesia Volume In Cc: 4
Drainage Amount?: minimal
Drainage Type?: bloody
Wound Care: Vaseline
Dressing: dry sterile dressing
Epidermal Sutures: 4-0 Ethilon
Epidermal Closure: simple interrupted

## 2021-07-19 NOTE — PROCEDURE: INCISION AND DRAINAGE
Method: 11 blade
Drainage Amount?: minimal
Anesthesia Type: 1% lidocaine without epinephrine
Dressing: dry sterile dressing
Epidermal Closure: simple interrupted
Size Of Lesion In Cm (Optional But May Be Required For Some Insurances): 4
Render Postcare In Note?: No
Wound Care: Vaseline
Lesion Type: Abscess
Drainage Type?: bloody
Detail Level: Detailed
Epidermal Sutures: 4-0 Ethilon

## 2021-09-14 ENCOUNTER — APPOINTMENT (RX ONLY)
Dept: URBAN - METROPOLITAN AREA CLINIC 44 | Facility: CLINIC | Age: 41
Setting detail: DERMATOLOGY
End: 2021-09-14

## 2021-09-14 ENCOUNTER — APPOINTMENT (RX ONLY)
Dept: URBAN - METROPOLITAN AREA CLINIC 45 | Facility: CLINIC | Age: 41
Setting detail: DERMATOLOGY
End: 2021-09-14

## 2021-09-14 DIAGNOSIS — L72.8 OTHER FOLLICULAR CYSTS OF THE SKIN AND SUBCUTANEOUS TISSUE: ICD-10-CM

## 2021-09-14 DIAGNOSIS — L0391 CELLULITIS AND ABSCESS OF UNSPECIFIED SITES: ICD-10-CM

## 2021-09-14 DIAGNOSIS — L0390 CELLULITIS AND ABSCESS OF UNSPECIFIED SITES: ICD-10-CM

## 2021-09-14 PROBLEM — L02.411 CUTANEOUS ABSCESS OF RIGHT AXILLA: Status: ACTIVE | Noted: 2021-09-14

## 2021-09-14 PROCEDURE — 99212 OFFICE O/P EST SF 10 MIN: CPT

## 2021-09-14 PROCEDURE — ? COUNSELING

## 2021-09-14 PROCEDURE — ? ADDITIONAL NOTES

## 2021-09-14 PROCEDURE — ? REFERRAL

## 2021-09-14 ASSESSMENT — LOCATION DETAILED DESCRIPTION DERM
LOCATION DETAILED: RIGHT POSTERIOR AXILLA
LOCATION DETAILED: RIGHT POSTERIOR AXILLA

## 2021-09-14 ASSESSMENT — LOCATION ZONE DERM
LOCATION ZONE: AXILLAE
LOCATION ZONE: AXILLAE

## 2021-09-14 ASSESSMENT — LOCATION SIMPLE DESCRIPTION DERM
LOCATION SIMPLE: RIGHT POSTERIOR AXILLA
LOCATION SIMPLE: RIGHT POSTERIOR AXILLA

## 2021-09-14 NOTE — PROCEDURE: ADDITIONAL NOTES
Additional Notes: R axilla abscess s/p 3 incisions and drainage this year.  It is calm today.  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  She had a (+) LN in the R axilla.
Render Risk Assessment In Note?: no
Additional Notes: Given the recurrent abscesses in this area, I think excision of cyst/scar tissue is reasonable to prevent recurrence.\\n\\nReferral placed to Dr. Rui Gonzalez, who patient says did initial lymphadenectomy several years ago.
Detail Level: Detailed

## 2021-09-14 NOTE — PROCEDURE: ADDITIONAL NOTES
Additional Notes: R axilla abscess s/p 3 incisions and drainage this year.  It is calm today.  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  She had a (+) LN in the R axilla.
Detail Level: Detailed
Render Risk Assessment In Note?: no
Additional Notes: Given the recurrent abscesses in this area, I think excision of cyst/scar tissue is reasonable to prevent recurrence.\\n\\nReferral placed to Dr. Kirk Zamudio, who patient says did initial lymphadenectomy several years ago.

## 2022-04-04 LAB
ANISOCYTOSIS BLD QL SMEAR: (no result)
BAND NEUTROPHILS # (MANUAL): 0 K/MM3
HCT VFR BLD CALC: 36.4 % (ref 30.3–42.9)
HGB BLD-MCNC: 12.1 GM/DL (ref 10.1–14.3)
MCHC RBC AUTO-ENTMCNC: 33 % (ref 30–34)
MCV RBC AUTO: 85 FL (ref 79–97)
MYELOCYTES # (MANUAL): 0 K/MM3
PLATELET # BLD: 198 K/MM3 (ref 140–440)
PROMYELOCYTES # (MANUAL): 0 K/MM3
RBC # BLD AUTO: 4.29 M/MM3 (ref 3.65–5.03)
TOTAL CELLS COUNTED BLD: 100

## 2022-04-08 ENCOUNTER — HOSPITAL ENCOUNTER (OUTPATIENT)
Dept: HOSPITAL 5 - OR | Age: 42
Discharge: HOME | End: 2022-04-08
Attending: STUDENT IN AN ORGANIZED HEALTH CARE EDUCATION/TRAINING PROGRAM
Payer: COMMERCIAL

## 2022-04-08 VITALS — SYSTOLIC BLOOD PRESSURE: 111 MMHG | DIASTOLIC BLOOD PRESSURE: 64 MMHG

## 2022-04-08 DIAGNOSIS — Z82.49: ICD-10-CM

## 2022-04-08 DIAGNOSIS — F41.9: ICD-10-CM

## 2022-04-08 DIAGNOSIS — D25.0: ICD-10-CM

## 2022-04-08 DIAGNOSIS — Z85.72: ICD-10-CM

## 2022-04-08 DIAGNOSIS — F43.10: ICD-10-CM

## 2022-04-08 DIAGNOSIS — Z98.890: ICD-10-CM

## 2022-04-08 DIAGNOSIS — D64.9: ICD-10-CM

## 2022-04-08 DIAGNOSIS — Z83.3: ICD-10-CM

## 2022-04-08 DIAGNOSIS — Z98.891: ICD-10-CM

## 2022-04-08 DIAGNOSIS — Z80.8: ICD-10-CM

## 2022-04-08 DIAGNOSIS — N92.0: Primary | ICD-10-CM

## 2022-04-08 DIAGNOSIS — Z20.822: ICD-10-CM

## 2022-04-08 DIAGNOSIS — Z79.899: ICD-10-CM

## 2022-04-08 PROCEDURE — 85007 BL SMEAR W/DIFF WBC COUNT: CPT

## 2022-04-08 PROCEDURE — 85025 COMPLETE CBC W/AUTO DIFF WBC: CPT

## 2022-04-08 PROCEDURE — U0003 INFECTIOUS AGENT DETECTION BY NUCLEIC ACID (DNA OR RNA); SEVERE ACUTE RESPIRATORY SYNDROME CORONAVIRUS 2 (SARS-COV-2) (CORONAVIRUS DISEASE [COVID-19]), AMPLIFIED PROBE TECHNIQUE, MAKING USE OF HIGH THROUGHPUT TECHNOLOGIES AS DESCRIBED BY CMS-2020-01-R: HCPCS

## 2022-04-08 PROCEDURE — 36415 COLL VENOUS BLD VENIPUNCTURE: CPT

## 2022-04-08 PROCEDURE — 88305 TISSUE EXAM BY PATHOLOGIST: CPT

## 2022-04-08 PROCEDURE — 58563 HYSTEROSCOPY ABLATION: CPT

## 2022-04-08 PROCEDURE — 84703 CHORIONIC GONADOTROPIN ASSAY: CPT

## 2022-04-08 PROCEDURE — C1782 MORCELLATOR: HCPCS

## 2022-04-08 NOTE — HISTORY AND PHYSICAL REPORT
History of Present Illness


Date of examination: 22


Date of admission: 


22


Chief complaint: 





heavy menstrual bleeding


History of present illness: 





Patient with history of HMB-L presenting for hysteroscopic myomectomy and 

ablation. Complains of heavy bleeding requiring her to stay home from work. DOes

not yet desire definitive management. Counseled on options and will proceed with

ablation following removal of her cavitary fibroid. 





Past History


Past Medical History: other (lymphoma)


Past Surgical History: 


Social history: no significant social history


Family history: no significant family history





Medications and Allergies


                                    Allergies











Allergy/AdvReac Type Severity Reaction Status Date / Time


 


No Known Drug Allergies Allergy  Unknown Verified 22 15:10











                                Home Medications











 Medication  Instructions  Recorded  Confirmed  Last Taken  Type


 


busPIRone [Buspar] 10 mg PO TID 17 09:00 History


 


Norgestimate-Ethinyl Estradiol 1 tab PO DAILY 22 09:00 

History





[Tri-Sprintec Tablet]     











Active Meds: 


Active Medications





Acetaminophen (Acetaminophen 500 Mg Tab)  1,000 mg PO PREOP [facility]


   Last Admin: 22 07:05 Dose:  1,000 mg


   


Hydromorphone HCl (Hydromorphone 1 Mg/1 Ml Inj)  0.5 mg IV Q10MIN PRN


   PRN Reason: Pain , Severe (7-10)


Lactated Ringer's (Lactated Ringers)  1,000 mls @ 100 mls/hr IV AS DIRECT MARCO


   Stop: 22 23:59


   Last Admin: 22 07:30 Dose:  100 mls/hr


   


Midazolam HCl (Midazolam 2 Mg/2 Ml Inj)  2 mg IV PREOP MARCO


   Last Admin: 22 07:35 Dose:  2 mg


   


Oxycodone/Acetaminophen (Oxycodone /Acetaminophen 5-325mg Tab)  1 tab PO ONCE 

PRN


   PRN Reason: Pain, Moderate (4-6)


Scopolamine (Scopolamine Transdermal Patch 72 Hr)  1 each TD PREOP [facility]


   Last Admin: 22 07:00 Dose:  1 each


   











Review of Systems


All systems: negative


Genitourinary Female: menorrhagia





Exam





- Constitutional


Vitals: 


                                        











Temp Pulse Resp BP Pulse Ox


 


 98.7 F   96 H  14   116/75   98 


 


 04/08/22 06:22  22 06:22  22 08:05  22 06:22  22 06:22











General appearance: Present: no acute distress, well-nourished





- Respiratory


Respiratory effort: normal


Respiratory: bilateral: CTA





- Cardiovascular


Heart Sounds: Present: S1 & S2.  Absent: rub, click





- Extremities


Extremities: pulses symmetrical, No edema





- Abdominal


General gastrointestinal: Present: soft, non-tender, non-distended, normal bowel

sounds





- Integumentary


Integumentary: Present: clear, warm, dry





Results





- Labs


CBC & Chem 7: 


                                 22 09:30








Assessment and Plan





Patient with submucosal fibroid and menorrhagia


To OR for hysteroscopic myomectomy and ablation 


Procedure and blood consent signed


Risks of procedure reviewed including pain, bleeding, infection, uterine 

perforation. 








- Patient Problems


(1) Menometrorrhagia


Current Visit: Yes   Status: Acute   





(2) Fibroids, submucosal


Current Visit: Yes   Status: Acute

## 2022-04-08 NOTE — OPERATIVE REPORT
Operative Report


Operative Report: 





PREOPERATIVE DIAGNOSIS:  abnormal uterine bleeding, submucosal fibroid for 

hysteroscopic myomectomy, novasure ablation 





 


 





POSTOPERATIVE DIAGNOSIS: same, s/p hysteroscopic myomectomy, D&C, novasure 

ablation 





 


 





PROCEDURE: Hysteroscopic myomectomy with endometrial ablation using NovaSure  





 


 





EBL: Minimal.  





 


 





ANESTHESIA: General.  





 


 





COMPLICATIONS: None





  





FLUID DEFICIT: 925 mL  





  





URINE OUTPUT: 100 mL  





 








FINDINGS:  





EUA: age-appropriate external genitalia, grossly normal appearing cervix 





Hysteroscopy: Abundant proliferative endometrium, submucosal fibroid 

approximately 3 cm from with uterine wall; both ostia visualized 





  





PROCEDURE IN DETAIL: Risks, benefits and alternatives discussed with the patient

at length. Informed consent was obtained. The patient was taken to the operating

room. General anesthesia was obtained without difficulty. She was prepped and 

draped in the normal sterile fashion in lithotomy position in yellowfin 

stirrups. Examination under anesthesia revealed above. Speculum was placed 

inside the vagina. The anterior lip of the cervix was grasped with single-tooth 

tenaculum. The cervix was sounded to 4 cm. The uterus was sounded to 10 cm, 

giving a 6 cm cavity length.  The cervix was gently dilated to accommodate the 

7mm diagnostic hysteroscope. Above findings were noted. The Myosure Reach was 

placed through the operative port and the fibroid was removed. NovaSure device 

was then introduced inside the uterus. Uterine width was assessed to be 3.5 cm. 

The cavity assessment done and no cavity defects identified. With these findings

noted, the instrument was enabled. The NovaSure proceeded for 30 seconds at a 

wattage of 124.  The NovaSure was removed. The hysteroscope was reintroduced. 

The entire endometrial cavity was noted to be completely ablated. All 

instruments removed from the patients vagina. Bleeding was noted from the 

tenaculum site. Silver nitrate applied to sites, but hemostasis not obtained. A 

figure of eight stitch of 3-0 vicryl was placed and hemostasis obtained.The 

patient was taken to the recovery room in stable condition.

## 2022-04-08 NOTE — SHORT STAY SUMMARY
Short Stay Documentation


Date of service: 22


Narrative H&P: 





Patient with h/o HMB-L presents for hysteroscopic myomectomy and endometrial 

ablation. See operative report for additional details. Patient tolerated 

procedure well. Discharge home in stable condition.





- History


Past Medical History: other (lymphoma)


Past Surgical History: 


Social history: no significant social history





- Allergies and Medications


Current Medications: 


                                    Allergies





No Known Drug Allergies Allergy (Verified 22 15:10)


   Unknown





                                Home Medications











 Medication  Instructions  Recorded  Confirmed  Last Taken  Type


 


busPIRone [Buspar] 10 mg PO TID 17 09:00 History


 


Norgestimate-Ethinyl Estradiol 1 tab PO DAILY 22 09:00 

History





[Tri-Sprintec Tablet]     


 


RX: Ibuprofen [Motrin 800 MG tab] 800 mg PO Q8HR #30 tablet 22  Unknown Rx








Active Medications





Acetaminophen (Acetaminophen 500 Mg Tab)  1,000 mg PO PREOP MARCO


   Last Admin: 22 07:05 Dose:  1,000 mg


   


Fentanyl (Fentanyl 100 Mcg/2 Ml Inj)  50 mcg IV Q5MIN PRN


   PRN Reason: Pain , Severe (7-10)


Lactated Ringer's (Lactated Ringers)  1,000 mls @ 100 mls/hr IV AS DIRECT MARCO


   Stop: 22 23:59


   Last Admin: 22 07:30 Dose:  100 mls/hr


   


Midazolam HCl (Midazolam 2 Mg/2 Ml Inj)  2 mg IV PREOP MARCO


   Last Admin: 22 07:35 Dose:  2 mg


   


Oxycodone/Acetaminophen (Oxycodone /Acetaminophen 5-325mg Tab)  1 tab PO ONCE 

PRN


   PRN Reason: Pain, Moderate (4-6)


   Stop: 22 19:00


Scopolamine (Scopolamine Transdermal Patch 72 Hr)  1 each TD PREOP [facility]CO


   Last Admin: 22 07:00 Dose:  1 each


   











- Physical exam


General appearance: no acute distress


Gastrointestinal: normal


Extremities: pulses symmetrical, No edema





- Hospital course


Hospital course: 





Patient to OR for hysteroscopic myomectomy and endometrial ablation. See 

operative note for additional details. tolerated procedure well. 





- Disposition


Condition at discharge: Stable


Disposition: 01 HOME / SELF CARE / HOMELESS





- Discharge Diagnoses


(1) Menometrorrhagia


Status: Acute   





(2) Fibroids, submucosal


Status: Acute   





Short Stay Discharge Plan


Follow up with: 


PRIMARY CARE,MD [Primary Care Provider] - 7 Days


Forms:  [facility]


Prescriptions: 


RX: Ibuprofen [Motrin 800 MG tab] 800 mg PO Q8HR #30 tablet

## 2022-04-08 NOTE — POST OPERATIVE NOTE
Pre-op diagnosis: menorrhagia, submucosal fibroid


Post-op diagnosis: same


Findings: 





Approximately 3 cm submucosal fibroid noted. Bilateral tubal ostia visualized. 

Good burn noted post ablation. 


Procedure: 





Hysteroscopic myomectomy, endometrial ablation 


Anesthesia: GETA


Surgeon: LISA ZARAGOZA


Estimated blood loss: 50-100ml


Pathology: list (fibroid)


Specimen disposition: to lab


Condition: stable


Disposition: PACU

## 2022-04-08 NOTE — ANESTHESIA CONSULTATION
Anesthesia Consult and Med Hx


Date of service: 04/08/22





- Airway


Anesthetic Teeth Evaluation: Good


ROM Head & Neck: Adequate


Mental/Hyoid Distance: Adequate


Mallampati Class: Class II


Intubation Access Assessment: Probably Good





- Pre-Operative Health Status


ASA Pre-Surgery Classification: ASA2


Proposed Anesthetic Plan: General





- Pulmonary


Hx Smoking: No


Hx Respiratory Symptoms: No





- Cardiovascular System


Hx Hypertension: No


Hx Heart Attack/AMI: No





- Central Nervous System


CVA: No


Hx Psychiatric Problems: Yes (anxiety)





- Endocrine


Hx Renal Disease: No


Hx Liver Disease: No


Hx Insulin Dependent Diabetes: No


Hx Non-Insulin Dependent Diabetes: No


Hx Thyroid Disease: No





- Hematic


Hx Anemia: Yes (iron infusions; current H/H normal)





- Other Systems


Hx Cancer: Yes (hx non-hodgkin's lymphoma in remission 10 yrs)





- Additional Comments


Anesthesia Medical History Comments: Hx mild PONV.